# Patient Record
Sex: FEMALE | Race: OTHER | HISPANIC OR LATINO | URBAN - METROPOLITAN AREA
[De-identification: names, ages, dates, MRNs, and addresses within clinical notes are randomized per-mention and may not be internally consistent; named-entity substitution may affect disease eponyms.]

---

## 2017-08-20 ENCOUNTER — EMERGENCY (EMERGENCY)
Facility: HOSPITAL | Age: 62
LOS: 1 days | Discharge: HOME | End: 2017-08-20
Attending: EMERGENCY MEDICINE | Admitting: EMERGENCY MEDICINE
Payer: COMMERCIAL

## 2017-08-20 VITALS
RESPIRATION RATE: 18 BRPM | TEMPERATURE: 99 F | DIASTOLIC BLOOD PRESSURE: 73 MMHG | OXYGEN SATURATION: 99 % | HEART RATE: 80 BPM | SYSTOLIC BLOOD PRESSURE: 120 MMHG | HEIGHT: 64 IN | WEIGHT: 293 LBS

## 2017-08-20 DIAGNOSIS — M25.561 PAIN IN RIGHT KNEE: ICD-10-CM

## 2017-08-20 DIAGNOSIS — I10 ESSENTIAL (PRIMARY) HYPERTENSION: ICD-10-CM

## 2017-08-20 DIAGNOSIS — Z79.82 LONG TERM (CURRENT) USE OF ASPIRIN: ICD-10-CM

## 2017-08-20 DIAGNOSIS — Z90.49 ACQUIRED ABSENCE OF OTHER SPECIFIED PARTS OF DIGESTIVE TRACT: Chronic | ICD-10-CM

## 2017-08-20 DIAGNOSIS — Z79.899 OTHER LONG TERM (CURRENT) DRUG THERAPY: ICD-10-CM

## 2017-08-20 DIAGNOSIS — Z98.890 OTHER SPECIFIED POSTPROCEDURAL STATES: Chronic | ICD-10-CM

## 2017-08-20 PROCEDURE — 99053 MED SERV 10PM-8AM 24 HR FAC: CPT

## 2017-08-20 PROCEDURE — 99283 EMERGENCY DEPT VISIT LOW MDM: CPT | Mod: 25

## 2017-08-20 PROCEDURE — 96372 THER/PROPH/DIAG INJ SC/IM: CPT

## 2017-08-20 PROCEDURE — 99284 EMERGENCY DEPT VISIT MOD MDM: CPT | Mod: 25

## 2017-08-20 RX ORDER — KETOROLAC TROMETHAMINE 30 MG/ML
30 SYRINGE (ML) INJECTION ONCE
Qty: 0 | Refills: 0 | Status: DISCONTINUED | OUTPATIENT
Start: 2017-08-20 | End: 2017-08-20

## 2017-08-20 RX ADMIN — Medication 30 MILLIGRAM(S): at 01:55

## 2017-08-20 NOTE — ED PROVIDER NOTE - OBJECTIVE STATEMENT
Pt ambulates to ED with right knee pain. Pt states she returned from [place] last night and was experiencing knee pain so she came to the ED. Pt rates pain 10/10 on VAS and has been feeling this pain for 3 months. Pt states she is currently not taking any medication for the pain. Pt states her orthopaedic doctor is Dr. Snyder and she usually follows up with him. Pt denies calf pain, shortness of breath.

## 2017-08-20 NOTE — ED PROVIDER NOTE - ATTENDING CONTRIBUTION TO CARE
61F with a known h/o bilateral knee arthritis who p/w months of anterior right knee pain. She was in [place] and treated with unknown meds there, she has seen Dr. Snyder in the past and been given a joint injection which helped.. She is scared to pursue TKA. She has not taken anything lately for pain. Pt's old xrays reviewed. Given lack of recent trauma or fall, no indication for new xrays at this time. Hx and exam not c/w DVT. Pt with no significant swelling, LE NVI distally, no evidence of cellulitis or septic joint. Pt noted to be morbidly obese, which is likely contributing to her sx. Pt treated with IM toradol and advised to f/u with Dr. Snyder for mgmt of arthritis. Stable for CO.

## 2017-08-20 NOTE — ED ADULT TRIAGE NOTE - ARRIVAL INFO ADDITIONAL COMMENTS
c.o right knee pain for 3 months, worsening today. denies any injuries. c.o sharp pains when she walks on extremities or when she sits down.

## 2017-08-20 NOTE — ED PROVIDER NOTE - MEDICAL DECISION MAKING DETAILS
Pt given Toradol for pain. Pt is obese. Advised pt to try to lose weight and focus on lifestyle changes to help decrease pressure on her knee. Right knee X-ray from 1/29/16 reviewed and showed degenerative changes of the right knee, with joint space narrowing of the medial tibiofemoral compartment and osteophytosis. Instructed pt to make an appt to f/u with Dr. Snyder. Pt stable for discharge.

## 2017-08-20 NOTE — ED PROVIDER NOTE - PHYSICAL EXAMINATION
Vasc: pedal pulses palpable. popliteal pulse palpable.   Derm: No erythema. No edema. No open lesions.  MSK: (-) anterior drawer test. (-) pain on valgus/varus stress on right knee. Pain on palpation of the anteriomedial right knee. muscle strength 5/5 in all compartments.  Neuro: grossly intact

## 2017-08-30 PROBLEM — Z00.00 ENCOUNTER FOR PREVENTIVE HEALTH EXAMINATION: Noted: 2017-08-30

## 2017-08-31 ENCOUNTER — OUTPATIENT (OUTPATIENT)
Dept: OUTPATIENT SERVICES | Facility: HOSPITAL | Age: 62
LOS: 1 days | End: 2017-08-31

## 2017-08-31 ENCOUNTER — APPOINTMENT (OUTPATIENT)
Dept: ORTHOPEDIC SURGERY | Facility: CLINIC | Age: 62
End: 2017-08-31

## 2017-08-31 ENCOUNTER — OTHER (OUTPATIENT)
Age: 62
End: 2017-08-31

## 2017-08-31 ENCOUNTER — APPOINTMENT (OUTPATIENT)
Dept: RADIOLOGY | Facility: CLINIC | Age: 62
End: 2017-08-31
Payer: COMMERCIAL

## 2017-08-31 ENCOUNTER — APPOINTMENT (OUTPATIENT)
Dept: ORTHOPEDIC SURGERY | Facility: CLINIC | Age: 62
End: 2017-08-31
Payer: COMMERCIAL

## 2017-08-31 VITALS — RESPIRATION RATE: 16 BRPM | BODY MASS INDEX: 49.51 KG/M2 | WEIGHT: 290 LBS | HEIGHT: 64 IN

## 2017-08-31 VITALS — DIASTOLIC BLOOD PRESSURE: 86 MMHG | HEART RATE: 75 BPM | SYSTOLIC BLOOD PRESSURE: 137 MMHG

## 2017-08-31 DIAGNOSIS — Z98.890 OTHER SPECIFIED POSTPROCEDURAL STATES: Chronic | ICD-10-CM

## 2017-08-31 DIAGNOSIS — Z80.51 FAMILY HISTORY OF MALIGNANT NEOPLASM OF KIDNEY: ICD-10-CM

## 2017-08-31 DIAGNOSIS — M19.90 UNSPECIFIED OSTEOARTHRITIS, UNSPECIFIED SITE: ICD-10-CM

## 2017-08-31 DIAGNOSIS — Z80.0 FAMILY HISTORY OF MALIGNANT NEOPLASM OF DIGESTIVE ORGANS: ICD-10-CM

## 2017-08-31 DIAGNOSIS — Z90.49 ACQUIRED ABSENCE OF OTHER SPECIFIED PARTS OF DIGESTIVE TRACT: Chronic | ICD-10-CM

## 2017-08-31 DIAGNOSIS — Z87.891 PERSONAL HISTORY OF NICOTINE DEPENDENCE: ICD-10-CM

## 2017-08-31 DIAGNOSIS — R73.9 HYPERGLYCEMIA, UNSPECIFIED: ICD-10-CM

## 2017-08-31 PROCEDURE — 73564 X-RAY EXAM KNEE 4 OR MORE: CPT | Mod: 26,50

## 2017-08-31 PROCEDURE — 99203 OFFICE O/P NEW LOW 30 MIN: CPT | Mod: 25

## 2017-08-31 PROCEDURE — 20610 DRAIN/INJ JOINT/BURSA W/O US: CPT | Mod: RT

## 2017-09-08 ENCOUNTER — APPOINTMENT (OUTPATIENT)
Dept: ORTHOPEDIC SURGERY | Facility: CLINIC | Age: 62
End: 2017-09-08
Payer: COMMERCIAL

## 2017-09-08 PROCEDURE — 99214 OFFICE O/P EST MOD 30 MIN: CPT

## 2017-09-13 ENCOUNTER — APPOINTMENT (OUTPATIENT)
Dept: ORTHOPEDIC SURGERY | Facility: CLINIC | Age: 62
End: 2017-09-13
Payer: COMMERCIAL

## 2017-09-13 PROCEDURE — 99214 OFFICE O/P EST MOD 30 MIN: CPT

## 2017-10-11 ENCOUNTER — APPOINTMENT (OUTPATIENT)
Dept: ORTHOPEDIC SURGERY | Facility: CLINIC | Age: 62
End: 2017-10-11

## 2017-10-25 ENCOUNTER — APPOINTMENT (OUTPATIENT)
Dept: ORTHOPEDIC SURGERY | Facility: CLINIC | Age: 62
End: 2017-10-25
Payer: COMMERCIAL

## 2017-10-25 DIAGNOSIS — M17.12 UNILATERAL PRIMARY OSTEOARTHRITIS, LEFT KNEE: ICD-10-CM

## 2017-10-25 PROCEDURE — 99213 OFFICE O/P EST LOW 20 MIN: CPT | Mod: 25

## 2017-10-25 PROCEDURE — 20610 DRAIN/INJ JOINT/BURSA W/O US: CPT | Mod: RT

## 2017-10-25 RX ORDER — DICLOFENAC SODIUM 10 MG/G
1 GEL TOPICAL
Qty: 100 | Refills: 1 | Status: DISCONTINUED | COMMUNITY
Start: 2017-08-31 | End: 2017-10-25

## 2017-10-25 RX ORDER — METFORMIN HYDROCHLORIDE 625 MG/1
TABLET ORAL
Refills: 0 | Status: DISCONTINUED | COMMUNITY
End: 2017-10-25

## 2017-10-25 RX ORDER — DICLOFENAC SODIUM 10 MG/G
1 GEL TOPICAL
Qty: 100 | Refills: 0 | Status: DISCONTINUED | COMMUNITY
Start: 2017-08-31 | End: 2017-10-25

## 2017-10-25 RX ORDER — ENALAPRIL MALEATE 5 MG/1
TABLET ORAL
Refills: 0 | Status: DISCONTINUED | COMMUNITY
End: 2017-10-25

## 2017-10-25 RX ORDER — ASPIRIN/CALCIUM/MAG/ALUMINUM 325 MG
325 TABLET, DELAYED RELEASE (ENTERIC COATED) ORAL
Refills: 0 | Status: DISCONTINUED | COMMUNITY
End: 2017-10-25

## 2017-11-01 ENCOUNTER — APPOINTMENT (OUTPATIENT)
Dept: ORTHOPEDIC SURGERY | Facility: CLINIC | Age: 62
End: 2017-11-01
Payer: COMMERCIAL

## 2017-11-01 PROCEDURE — 20610 DRAIN/INJ JOINT/BURSA W/O US: CPT | Mod: RT

## 2017-11-08 ENCOUNTER — APPOINTMENT (OUTPATIENT)
Dept: ORTHOPEDIC SURGERY | Facility: CLINIC | Age: 62
End: 2017-11-08
Payer: COMMERCIAL

## 2017-11-08 DIAGNOSIS — M17.11 UNILATERAL PRIMARY OSTEOARTHRITIS, RIGHT KNEE: ICD-10-CM

## 2017-11-08 PROCEDURE — 20610 DRAIN/INJ JOINT/BURSA W/O US: CPT | Mod: RT

## 2017-11-08 RX ORDER — HYALURONATE SODIUM 10 MG/ML
20 VIAL (ML) INTRAARTICULAR
Qty: 1 | Refills: 0 | Status: DISCONTINUED | OUTPATIENT
Start: 2017-09-13 | End: 2017-11-08

## 2017-12-08 ENCOUNTER — APPOINTMENT (OUTPATIENT)
Dept: ORTHOPEDIC SURGERY | Facility: CLINIC | Age: 62
End: 2017-12-08

## 2021-08-09 NOTE — ED ADULT NURSE NOTE - CHPI ED SYMPTOMS NEG
Refill request: Levemir 100    Last OV 4/15/21  Last Date filled 5/25/21  Next OV8/17/21  A!C level 7.2 on 3/5/21    Medication approved for refill.    no numbness/no deformity/no tingling/no weakness/no fever/no abrasion/no back pain/no bruising

## 2022-02-05 PROBLEM — I10 ESSENTIAL (PRIMARY) HYPERTENSION: Chronic | Status: ACTIVE | Noted: 2017-08-20

## 2022-04-12 ENCOUNTER — NON-APPOINTMENT (OUTPATIENT)
Age: 67
End: 2022-04-12

## 2022-04-12 ENCOUNTER — APPOINTMENT (OUTPATIENT)
Dept: OBGYN | Facility: CLINIC | Age: 67
End: 2022-04-12
Payer: MEDICAID

## 2022-04-12 VITALS — DIASTOLIC BLOOD PRESSURE: 90 MMHG | WEIGHT: 293 LBS | BODY MASS INDEX: 55.61 KG/M2 | SYSTOLIC BLOOD PRESSURE: 150 MMHG

## 2022-04-12 DIAGNOSIS — N95.0 POSTMENOPAUSAL BLEEDING: ICD-10-CM

## 2022-04-12 PROCEDURE — 99203 OFFICE O/P NEW LOW 30 MIN: CPT | Mod: 25

## 2022-04-12 PROCEDURE — 58100 BIOPSY OF UTERUS LINING: CPT

## 2022-04-12 NOTE — REASON FOR VISIT
[Initial] : an initial consultation for [Postmenopausal Bleeding] : postmenopausal bleeding [Spouse] : spouse

## 2022-04-13 NOTE — PLAN
[FreeTextEntry1] : Ms. Rivera presents for evaluation of postmenopausal bleeding.\par - Vitals reviewed - BP elevated but patient has known hypertension and she is compliant with her medications. \par - Breast exam, pelvic exam and pap smear performed today. \par - EMB performed given postmenopausal bleeding. \par - Patient has a referral for screening mammogram; she is up to date with colonoscopy. \par - Referral for pelvic sonogram given. \par  \par Return to the office pending results, as needed for GYN concerns and in 1 year for annual follow up. Plan of care discussed with patient who has no additional questions and is in agreement.\par

## 2022-04-13 NOTE — HISTORY OF PRESENT ILLNESS
[postmenopausal] : postmenopausal [Post-Menopause, No Sxs] : post-menopausal, currently without symptoms [FreeTextEntry1] : Ms. LASHELL CAVAZOS  is a 67 yo postmenopausal P3 who presents today evaluation of postmenopausal bleeding. The patient reports intermittent bleeding since before the onset of the COVID19 pandemic. The bleeding is sporadic and not every month. She occasionally notes clots and the bleeding ranges from bright red to dark brown. Denies any associated pain or blood in her bowel movements. She reports that she feels otherwise well. \par \par ObHx:  x3\par GynHx: Denies abnormal pap smears, STIs, ovarian cysts, fibroids; not currently sexually active; postmenopausal for >15 years; unsure of the date of the last pap smear \par MedHx: Stroke in 2022, hypertension, pre-diabetes, obesity, arthritis\par SurgHx: Right knee and ankle surgery \par NKDA ; allergy to adhesive \par Medications: metformin, enalapril, ASA\par \par Patient denies vaginal discomfort/itching, vaginal discharge, dysuria, changes to her bowel habits, incontinence or any other GYN symptoms. [Mammogramdate] : 2021 [PapSmeardate] : 2021 [PGHxTotal] : 3 [[facility] : [facility]

## 2022-04-13 NOTE — PHYSICAL EXAM
[Chaperone Present] : A chaperone was present in the examining room during all aspects of the physical examination [Appropriately responsive] : appropriately responsive [Alert] : alert [Soft] : soft [Non-tender] : non-tender [No Lesions] : no lesions [No Mass] : no mass [Oriented x3] : oriented x3 [FreeTextEntry7] : Exam limited by patient's habitus  [Breast Atrophy Bilateral] : atrophy [No Masses] : no breast masses were palpable [Labia Majora] : normal [Labia Minora] : normal [Normal] : normal [FreeTextEntry5] : bleeding visualized from the os  [FreeTextEntry6] : Not able to assess secondary to habitus

## 2022-04-13 NOTE — PROCEDURE
[Cervical Pap Smear] : cervical Pap smear [Liquid Base] : liquid base [Endometrial Biopsy] : Endometrial biopsy [Consent Obtained] : Consent obtained [Post-Menop. Bleeding] : post-menopausal bleeding [Risks] : risks [Benefits] : benefits [Alternatives] : alternatives [Patient] : patient [Infection] : infection [Bleeding] : bleeding [Allergic Reaction] : allergic reaction [Uterine Perforation] : uterine perforation [Pain] : pain [Betadine] : Betadine [None] : none [Tenaculum] : Tenaculum [Easy Passage] : Easy passage [Retroverted] : retroverted [Moderate] : moderate [Sent to Pathology] : placed in buffered formalin and sent for pathology [ECC] : Endocervical curettage was also performed [Tolerated Well] : Patient tolerated the procedure well [No Complications] : No complications

## 2022-04-19 LAB — CYTOLOGY CVX/VAG DOC THIN PREP: NORMAL

## 2022-05-03 ENCOUNTER — APPOINTMENT (OUTPATIENT)
Dept: GYNECOLOGIC ONCOLOGY | Facility: CLINIC | Age: 67
End: 2022-05-03
Payer: MEDICAID

## 2022-05-06 ENCOUNTER — APPOINTMENT (OUTPATIENT)
Dept: GYNECOLOGIC ONCOLOGY | Facility: CLINIC | Age: 67
End: 2022-05-06
Payer: MEDICAID

## 2022-05-06 ENCOUNTER — NON-APPOINTMENT (OUTPATIENT)
Age: 67
End: 2022-05-06

## 2022-05-06 VITALS
WEIGHT: 293 LBS | OXYGEN SATURATION: 96 % | HEIGHT: 64 IN | HEART RATE: 78 BPM | SYSTOLIC BLOOD PRESSURE: 156 MMHG | TEMPERATURE: 97.3 F | DIASTOLIC BLOOD PRESSURE: 94 MMHG | BODY MASS INDEX: 50.02 KG/M2

## 2022-05-06 PROCEDURE — 99205 OFFICE O/P NEW HI 60 MIN: CPT

## 2022-05-06 NOTE — CHIEF COMPLAINT
[FreeTextEntry1] : 67 yo postmenopausal P3 who presents today with CAH results in EMB (22) for PMB.\par The patient reports intermittent bleeding since before the onset of the COVID19 pandemic. The bleeding is sporadic and not every month. She occasionally notes clots and the bleeding ranges from bright red to dark brown. Denies any associated pain or blood in her bowel movements. She reports that she feels otherwise well. \par \par Patient denies vaginal discomfort/itching, vaginal discharge, dysuria, changes to her bowel habits, incontinence or any other GYN symptoms. \par \par ObHx:  x3\par GynHx: Denies abnormal pap smears, STIs, ovarian cysts, fibroids; not currently sexually active; premature menopause at the age ~37 (no work-up was done) post-menopausal, currently without symptoms. \par MedHx: Stroke in 2022 (right hand weakness), went to [facility] by then could not move her legs with nausea. CT head normal scan, takes ASA since then., hypertension, pre-diabetes, obesity, arthritis\par SurgHx: Right knee and ankle surgery, L/S cholecystectomy (a few years back)\par \par Social: Former smoker , No alcohol use, No illicit drug use\par NKDA ; allergy to adhesive \par Medications: metformin QD, enalapril QD, ASA 81mg\par Family history of liver cancer - Mother, and malignant neoplasm of kidney - brother.\par \par \par Health Maintenance\par PAP Smear: 2022 - WNL\par Last mammogram:  WNL\par Last colonoscopy:  WNL\par Exposed to COVID , Vaccinated x2

## 2022-05-06 NOTE — ASSESSMENT
[FreeTextEntry1] : With the aid of diagrams, we reviewed the findings and discussed the pathology results. \par Endometrial hyperplasia is caused by an imbalance between estrogen and progesterone, often described as “unopposed estrogen.” The risk of complex hyperplasia with atypia progressing to endometrial cancer is above 30%. Recent data has shown that there is a 40% chance that endometrial cancer will be discovered in the uterus at the time of surgery for complex atypical hyperplasia.\par \par Recommendation is for surgical removal of the uterus with removal of the bilateral tubes and ovaries. Different surgical approaches discussed including minimally invasive and open approaches. I recommend advanced laparoscopic robotic assisted total hysterectomy, bilateral salpingo-oophorectomy. I explained that a SLN biopsy is normally done, but in her case I do not think it will be technically possible.  \par \par Complications that include, but are not limited to: bleeding, infection, injury to other organs including bowel, bladder, ureters, blood vessels, nerves; infections, blood clots, lymphedema, pneumonia, wound complications and prolonged hospital stay have all been discussed with the patient. Whenever minimally invasive surgery is attempted, there is a chance of needing to convert to laparotomy. The risk of occult injury requiring additional surgery also discussed. I have also provided her with the diagrams. I emphasized that this patient is at markedly higher risk of complications 2' to her morbid obesity and that I may have to abort if I do not feel I can complete the surgery safely. In this case she will be offered progesterone therapy and referral for weight loss. \par \par Surgical scheduling was discussed and instructions for optimization prior to surgery were given. She will have a clear liquid diet one day prior, and will follow the Enhanced Recovery after Surgery (ERAS) protocol.  No aspirin or NSAID products for 1 week prior. She will choose a surgical date.\par \par [] Neuro clearance (recent stroke?)\par [] Medical clearance\par [] COVID test pre-op\par [] CT abdomen pelvis\par [] Robot assisted TLH/BSO with Dr. Lew

## 2022-05-06 NOTE — HISTORY OF PRESENT ILLNESS
[FreeTextEntry1] : Problem List\par 1. CAH\par \par Previous Therapy\par 1. PAP 4/19/22- Neg \par 1. S/P EMB 4/25/22- CAH\par 2. S/P ECC 4/25/22- Endocervical mucosa with squamous metaplasia

## 2022-05-09 ENCOUNTER — NON-APPOINTMENT (OUTPATIENT)
Age: 67
End: 2022-05-09

## 2022-05-12 ENCOUNTER — APPOINTMENT (OUTPATIENT)
Dept: NEUROLOGY | Facility: CLINIC | Age: 67
End: 2022-05-12
Payer: MEDICAID

## 2022-05-12 VITALS
HEIGHT: 64 IN | BODY MASS INDEX: 50.02 KG/M2 | SYSTOLIC BLOOD PRESSURE: 142 MMHG | OXYGEN SATURATION: 95 % | TEMPERATURE: 97.9 F | DIASTOLIC BLOOD PRESSURE: 84 MMHG | WEIGHT: 293 LBS | HEART RATE: 69 BPM

## 2022-05-12 PROCEDURE — 99214 OFFICE O/P EST MOD 30 MIN: CPT

## 2022-05-12 PROCEDURE — 99204 OFFICE O/P NEW MOD 45 MIN: CPT

## 2022-05-13 RX ORDER — ENALAPRIL MALEATE 10 MG/1
10 TABLET ORAL DAILY
Refills: 0 | Status: ACTIVE | COMMUNITY

## 2022-05-13 RX ORDER — METFORMIN HYDROCHLORIDE 500 MG/1
500 TABLET, COATED ORAL DAILY
Refills: 0 | Status: ACTIVE | COMMUNITY

## 2022-05-13 NOTE — HISTORY OF PRESENT ILLNESS
[FreeTextEntry1] : Pt is a 66yoF with PMH HTN, pre-DM here for surgical clearance for biopsy of uterine mass. SHe was seen in [facility] In March for recurrent episodes of R arm feeling heavy with associated slurred speech. Had 4 episodes in total, including one while in the ER. She also reports nausea and whole body weakness "I couldn't get out of the cab" at the time of these episodes. Unfortunately pt does not have any records with her today for my review. SHe is unclear what the results of her CT/ MRI/ vessel imaging were, but says she was told she had "small stroke." She says she had EEG which was reportedly negative. She is not sure if echo was done (either TTE/ MANFRED). She has not had any cardiac rhythm monitoring after her d/c. She says she was d/c home on ASA 81mg daily, 30 day course of plavix which she has already completed, as well as  atorvastatin 10mg daily. She has not had any recurrent stroke-like episodes since. She denies any residual deficits from her TIA/stroke. 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 \par \par

## 2022-05-13 NOTE — PHYSICAL EXAM
[FreeTextEntry1] : The patient is alert and oriented x3, naming intact x3, repetition normal, follows three-step commands, and is able to participate fully in the history taking.\par Speech is normal with no evidence of dysarthria.\par Memory is intact: Immediate recall 3 out of 3, short-term 3 out of 3, remote memory intact\par Cranial nerves II through XII intact\par Motor exam: Upper and lower extremities 5 out of 5 power, normal tone. No abnormal movements noted.\par Sensory exam: Intact to light touch and pinprick. Romberg negative.\par Coordination and vestibular exam: Finger to nose intact, no evidence of truncal or appendicular ataxia. No evidence of nystagmus. no vestibular symptoms elicited with head turning during ambulation.\par Gait: steady\par Reflexes: One to 2+ in upper and lower extremities. No pathological reflexes. Downgoing toes.\par

## 2022-05-13 NOTE — ASSESSMENT
[FreeTextEntry1] : Pt is a 66yoF with PMH HTN, pre-DM, here for neurologic pre-op clearance for total hysterectomy in the setting of recent TIA/ stroke. Unfortunately this pt is new to me and given that she has no records with her I can not provide her with clearance today. I do not know the results of her workup thus far nor do I know the suspected mechanism behind her stroke/ TIA.  I have advised her to obtain records from [facility] by this week or early next week for my review. I do anticipate being able to give her clearance which I would like to do in an expedited fashion given the nature of the procedure. My sense is that she had TIA (as she is pretty certain that her MRI was negative for acute stroke) and if that were the case aspirin monotherapy would be appropriate. If that is the case, clearance is straightfrward which I would provide with routine stroke precautions. On another note, I explained that we may still need to do further workup into etiology of her TIA including TTE and cardiac rhythm monitoring (I prefer ILR even if TIA). WIll wait to see what has been already done at [facility] and then discuss with her my recommendations. I have provided her with my phone number/ email so that she contact me easily when she gets these records.

## 2022-07-15 ENCOUNTER — APPOINTMENT (OUTPATIENT)
Dept: NEUROLOGY | Facility: CLINIC | Age: 67
End: 2022-07-15

## 2022-07-15 VITALS
DIASTOLIC BLOOD PRESSURE: 77 MMHG | HEART RATE: 85 BPM | BODY MASS INDEX: 50.02 KG/M2 | WEIGHT: 293 LBS | TEMPERATURE: 98.1 F | OXYGEN SATURATION: 96 % | HEIGHT: 64 IN | SYSTOLIC BLOOD PRESSURE: 120 MMHG

## 2022-07-15 DIAGNOSIS — Z86.73 PERSONAL HISTORY OF TRANSIENT ISCHEMIC ATTACK (TIA), AND CEREBRAL INFARCTION W/OUT RESIDUAL DEFICITS: ICD-10-CM

## 2022-07-15 LAB — PLATELET RESPONSE ASPIRIN: 648 ARU

## 2022-07-15 PROCEDURE — 99214 OFFICE O/P EST MOD 30 MIN: CPT

## 2022-07-15 RX ORDER — CLOPIDOGREL BISULFATE 75 MG/1
75 TABLET, FILM COATED ORAL DAILY
Refills: 0 | Status: DISCONTINUED | COMMUNITY
End: 2022-07-15

## 2022-07-15 RX ORDER — ASPIRIN 81 MG/1
81 TABLET, CHEWABLE ORAL
Qty: 30 | Refills: 0 | Status: ACTIVE | COMMUNITY
Start: 2022-05-17

## 2022-07-15 RX ORDER — FOLIC ACID 1 MG/1
1 TABLET ORAL
Qty: 30 | Refills: 0 | Status: ACTIVE | COMMUNITY
Start: 2022-07-07

## 2022-07-15 RX ORDER — GABAPENTIN 300 MG/1
300 CAPSULE ORAL
Qty: 60 | Refills: 0 | Status: ACTIVE | COMMUNITY
Start: 2022-05-17

## 2022-07-15 NOTE — ASSESSMENT
[FreeTextEntry1] : Pt is a 66yoF with PMH HTN, pre-DM, here for neurologic pre-op clearance for total hysterectomy in the setting of recent TIA/ stroke in March 2022. Would prefer to have results from her hospital stay but given concern for underlying malignancy the priority is for her to proceed with treatment of her uterine mass. I am more comfortable doing so as she has reportedly only had TIA and has been doing well since March- 4+ months- with no recurrent events. She should continue with Aspirin 81mg daily and Atovastatin for secondary stroke prevention. She should monitor for any bleeding concerns or myalgias which are typical side effects of these medications. Pt is cleared for surgery with general TIA/ stroke precautions- letter provided below. Will order routine MRI and vessel imaging via US for baseline, as well as other routine stroke labs since I am unable to view any of these results from [place] and would prefer to have these in our system. Will also order routine stroke labs to help optimize her medically and consider and further medication adjustments. Will refer to EP for holter/ ILR as that has not yet been done. She does not need to wait for all these tests to be complete prior to proceeding with surgery. \par Counselled on healthy eating (DASH/ Mediterranean diet, limiting red meats) and importance of weight reduction for a neurovascular/ cardiovascular standpoint. Counselled on importance of remaining active. She should also continue to f/u with PCP regarding regular health maintenance and prevention, including routine screening. Counselled on signs of stroke BEFAST and to call 911 with any new or worsening neurological symptoms \par  \america William [phone]- requests results be given to her at this number

## 2022-07-15 NOTE — HISTORY OF PRESENT ILLNESS
[FreeTextEntry1] : Pt is a 66yoF with PMH HTN, pre-DM, morbid obesity, here for f/u and surgical clearance for biopsy of uterine mass.She is accompanied today by her daughter Nataliia who is helping to provide history. Briefly, she was seen in [facility] In March for recurrent episodes of R arm feeling heavy with associated slurred speech. Had 4 episodes in total, including one while in the ER. She also reports nausea and whole body weakness "I couldn't get out of the cab" at the time of these episodes. Unfortunately has still not been able to obtain any records from her hospital stay although she is confident that her workup was unremarkable (including MRI, TTE) and she was told the episode was a TIA. She has completed 3 month course of DAPT and is now on ASA 81mg daily alone, as directed to her at time of discharge from [place]. She has not had any recurrent stroke-like episodes since. She denies any residual deficits from her TIA/stroke. 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 BP is well-controlled, 120/77 in office today. \par \par

## 2022-07-15 NOTE — CONSULT LETTER
[Courtesy Letter:] : I had the pleasure of seeing your patient, [unfilled], in my office today. [FreeTextEntry1] : This letter is to certify that Georgia Rivera is a patient at [facility] with a past history of TIA/ stroke. While the patient is neurologically cleared for hysterectomy, there are general safety considerations that apply to patients with past stroke who are on antiplatelet medications. \par From a neurovascular standpoint, optimally it would be beneficial for her to continue antiplatelet therapy throughout the kathryn -procedural period. If her antiplatelet therapy needs to be discontinued prior to the surgical procedure, then she would be at an increased risk for stroke during that time interval.  If antiplatelet therapy needs to be stopped, it should be withheld for the shortest duration of time possible and should be restarted as soon as possible as deemed safe by the surgical team. Increased risk of thromboembolic complications such as stroke during the period that antiplatelet therapy is withheld was discussed with patient in detail. \par It is also advised to maintain normotension and avoid any episodes of hypotension in the kathryn-procedural period.\par If you have any additional questions please feel free to contact me at [phone].  [FreeTextEntry3] : Shirlene Stauffer AGACNP-C\par Nurse Practitioner\par [facility] \par Department of Neurology\par [address]\par [place] [id number]\par Tel: [phone]\par Fax: [phone]\par Email: [email or web address] \par

## 2022-07-18 LAB
ALBUMIN SERPL ELPH-MCNC: 4.2 G/DL
ALP BLD-CCNC: 97 U/L
ALT SERPL-CCNC: 12 U/L
ANION GAP SERPL CALC-SCNC: 12 MMOL/L
APO LP(A) SERPL-MCNC: 11.3 NMOL/L
AST SERPL-CCNC: 13 U/L
BASOPHILS # BLD AUTO: 0.04 K/UL
BASOPHILS NFR BLD AUTO: 0.4 %
BILIRUB SERPL-MCNC: 0.3 MG/DL
BUN SERPL-MCNC: 20 MG/DL
CALCIUM SERPL-MCNC: 9.4 MG/DL
CHLORIDE SERPL-SCNC: 102 MMOL/L
CO2 SERPL-SCNC: 25 MMOL/L
CREAT SERPL-MCNC: 0.72 MG/DL
EGFR: 93 ML/MIN/1.73M2
EOSINOPHIL # BLD AUTO: 0.11 K/UL
EOSINOPHIL NFR BLD AUTO: 1 %
ESTIMATED AVERAGE GLUCOSE: 120 MG/DL
GLUCOSE SERPL-MCNC: 124 MG/DL
HBA1C MFR BLD HPLC: 5.8 %
HCT VFR BLD CALC: 42 %
HGB BLD-MCNC: 12.6 G/DL
IMM GRANULOCYTES NFR BLD AUTO: 0.5 %
LYMPHOCYTES # BLD AUTO: 1.91 K/UL
LYMPHOCYTES NFR BLD AUTO: 17.3 %
MAN DIFF?: NORMAL
MCHC RBC-ENTMCNC: 25 PG
MCHC RBC-ENTMCNC: 30 GM/DL
MCV RBC AUTO: 83.5 FL
MONOCYTES # BLD AUTO: 0.73 K/UL
MONOCYTES NFR BLD AUTO: 6.6 %
NEUTROPHILS # BLD AUTO: 8.18 K/UL
NEUTROPHILS NFR BLD AUTO: 74.2 %
PLATELET # BLD AUTO: 359 K/UL
POTASSIUM SERPL-SCNC: 3.8 MMOL/L
PROT SERPL-MCNC: 7.4 G/DL
RBC # BLD: 5.03 M/UL
RBC # FLD: 14.6 %
SODIUM SERPL-SCNC: 139 MMOL/L
TSH SERPL-ACNC: 2.23 UIU/ML
WBC # FLD AUTO: 11.03 K/UL

## 2022-07-18 RX ORDER — ASPIRIN 81 MG/1
81 TABLET ORAL
Qty: 30 | Refills: 1 | Status: ACTIVE | COMMUNITY
Start: 2022-07-18 | End: 1900-01-01

## 2022-07-19 LAB
CHOLEST SERPL-MCNC: 152 MG/DL
HDLC SERPL-MCNC: 52 MG/DL
LDLC SERPL CALC-MCNC: 84 MG/DL
NONHDLC SERPL-MCNC: 100 MG/DL
TRIGL SERPL-MCNC: 79 MG/DL

## 2022-07-19 RX ORDER — ATORVASTATIN CALCIUM 40 MG/1
40 TABLET, FILM COATED ORAL
Qty: 1 | Refills: 1 | Status: ACTIVE | COMMUNITY
Start: 1900-01-01 | End: 1900-01-01

## 2022-07-25 ENCOUNTER — RESULT REVIEW (OUTPATIENT)
Age: 67
End: 2022-07-25

## 2022-07-25 ENCOUNTER — OUTPATIENT (OUTPATIENT)
Dept: OUTPATIENT SERVICES | Facility: HOSPITAL | Age: 67
LOS: 1 days | End: 2022-07-25
Payer: MEDICAID

## 2022-07-25 ENCOUNTER — APPOINTMENT (OUTPATIENT)
Dept: MRI IMAGING | Facility: HOSPITAL | Age: 67
End: 2022-07-25

## 2022-07-25 DIAGNOSIS — Z90.49 ACQUIRED ABSENCE OF OTHER SPECIFIED PARTS OF DIGESTIVE TRACT: Chronic | ICD-10-CM

## 2022-07-25 DIAGNOSIS — Z98.890 OTHER SPECIFIED POSTPROCEDURAL STATES: Chronic | ICD-10-CM

## 2022-07-25 PROCEDURE — 70551 MRI BRAIN STEM W/O DYE: CPT

## 2022-07-25 PROCEDURE — 70551 MRI BRAIN STEM W/O DYE: CPT | Mod: 26

## 2022-08-03 ENCOUNTER — APPOINTMENT (OUTPATIENT)
Dept: NEUROLOGY | Facility: CLINIC | Age: 67
End: 2022-08-03

## 2022-08-05 ENCOUNTER — APPOINTMENT (OUTPATIENT)
Dept: NEUROLOGY | Facility: CLINIC | Age: 67
End: 2022-08-05

## 2022-08-05 PROCEDURE — 93886 INTRACRANIAL COMPLETE STUDY: CPT

## 2022-08-05 PROCEDURE — 93880 EXTRACRANIAL BILAT STUDY: CPT

## 2022-11-07 ENCOUNTER — NON-APPOINTMENT (OUTPATIENT)
Age: 67
End: 2022-11-07

## 2022-11-10 ENCOUNTER — APPOINTMENT (OUTPATIENT)
Dept: GYNECOLOGIC ONCOLOGY | Facility: HOSPITAL | Age: 67
End: 2022-11-10

## 2022-11-15 ENCOUNTER — APPOINTMENT (OUTPATIENT)
Dept: GYNECOLOGIC ONCOLOGY | Facility: CLINIC | Age: 67
End: 2022-11-15
Payer: MEDICARE

## 2022-11-15 ENCOUNTER — NON-APPOINTMENT (OUTPATIENT)
Age: 67
End: 2022-11-15

## 2022-11-15 VITALS
WEIGHT: 293 LBS | HEART RATE: 85 BPM | OXYGEN SATURATION: 98 % | TEMPERATURE: 97.3 F | BODY MASS INDEX: 50.02 KG/M2 | SYSTOLIC BLOOD PRESSURE: 144 MMHG | HEIGHT: 64 IN | DIASTOLIC BLOOD PRESSURE: 84 MMHG

## 2022-11-15 DIAGNOSIS — N85.02 ENDOMETRIAL INTRAEPITHELIAL NEOPLASIA [EIN]: ICD-10-CM

## 2022-11-15 PROCEDURE — 99215 OFFICE O/P EST HI 40 MIN: CPT

## 2022-11-15 NOTE — REASON FOR VISIT
[FreeTextEntry1] : Follow up exam prior to surgery . CT A/P still needed\par \par Got both neurology and PCP clearance yesterday, will have results faxed over. States she was supposed to have surgery last week but was cancelled because she still needed clearance. Reports dry cough last week that has now resolved. Denies fevers, chills, or body aches. Patient denies vaginal discomfort/itching, vaginal discharge, dysuria, changes to her bowel habits, incontinence or any other GYN symptoms. \par \par ObHx:  x3\par GynHx: Denies abnormal pap smears, STIs, ovarian cysts, fibroids; not currently sexually active; premature menopause at the age ~37 (no work-up was done) post-menopausal, currently without symptoms. \par MedHx: Stroke in 2022 (right hand weakness), went to [facility] by then could not move her legs with nausea. CT head normal scan, takes ASA since then., hypertension, pre-diabetes, obesity, arthritis\par SurgHx: Right knee and ankle surgery, L/S cholecystectomy (a few years back)\par \par Social: Former smoker , No alcohol use, No illicit drug use\par NKDA ; allergy to adhesive \par Medications: metformin QD, enalapril QD, ASA 81mg\par Family history of liver cancer - Mother, and malignant neoplasm of kidney - brother.

## 2022-11-15 NOTE — PHYSICAL EXAM
[Normal] : Anus and perineum: Normal sphincter tone, no masses, no prolapse. [de-identified] : central obesity, nontender, umbilical scar from prior surgery [de-identified] : Could not appreciate pelvic organs adequately. Patient's obesity does not allow for a good exam.

## 2022-11-15 NOTE — ASSESSMENT
[FreeTextEntry1] : With the aid of diagrams, we reviewed the findings and discussed the pathology results. \par Endometrial hyperplasia is caused by an imbalance between estrogen and progesterone, often described as “unopposed estrogen.” The risk of complex hyperplasia with atypia progressing to endometrial cancer is above 30%. Recent data has shown that there is a 40% chance that endometrial cancer will be discovered in the uterus at the time of surgery for complex atypical hyperplasia.\par \par Recommendation is for surgical removal of the uterus with removal of the bilateral tubes and ovaries. Different surgical approaches discussed including minimally invasive and open approaches. I recommend advanced laparoscopic robotic assisted total hysterectomy, bilateral salpingo-oophorectomy. I explained that a SLN biopsy is normally done, but in her case I do not think it will be technically possible. \par \par Complications that include, but are not limited to: bleeding, infection, injury to other organs including bowel, bladder, ureters, blood vessels, nerves; infections, blood clots, lymphedema, pneumonia, wound complications and prolonged hospital stay have all been discussed with the patient. Whenever minimally invasive surgery is attempted, there is a chance of needing to convert to laparotomy. The risk of occult injury requiring additional surgery also discussed. I have also provided her with the diagrams. I emphasized that this patient is at markedly higher risk of complications 2' to her morbid obesity and that I may have to abort if I do not feel I can complete the surgery safely. In this case she will be offered progesterone therapy and referral for weight loss. In any case, I strongly recommend that she consider weight loss methods. \par \par Surgical scheduling was discussed and instructions for optimization prior to surgery were given. She will have a clear liquid diet one day prior, and will follow the Enhanced Recovery after Surgery (ERAS) protocol. No aspirin or NSAID products for 1 week prior. She will choose a surgical date.\par \par [] Neuro clearance- July note seems to clear her\par [] Medical clearance\par [] COVID test pre-op\par [] CT abdomen pelvis, CXR\par [] TLH/BSO\par

## 2022-11-15 NOTE — CHIEF COMPLAINT
[FreeTextEntry1] : 67 yo postmenopausal P3 who presents today with CAH results in EMB (22) for PMB.\par The patient reports intermittent bleeding since before the onset of the COVID19 pandemic. The bleeding is sporadic and not every month. She occasionally notes clots and the bleeding ranges from bright red to dark brown. Denies any associated pain or blood in her bowel movements. She reports that she feels otherwise well. \par \par Patient denies vaginal discomfort/itching, vaginal discharge, dysuria, changes to her bowel habits, incontinence or any other GYN symptoms. \par \par ObHx:  x3\par GynHx: Denies abnormal pap smears, STIs, ovarian cysts, fibroids; not currently sexually active; premature menopause at the age ~37 (no work-up was done) post-menopausal, currently without symptoms. \par MedHx: Stroke in 2022 (right hand weakness), went to [facility] by then could not move her legs with nausea. CT head normal scan, takes ASA since then., hypertension, pre-diabetes, obesity, arthritis\par SurgHx: Right knee and ankle surgery, L/S cholecystectomy (a few years back)\par \par Social: Former smoker , No alcohol use, No illicit drug use\par NKDA ; allergy to adhesive \par Medications: metformin QD, enalapril QD, ASA 81mg\par Family history of liver cancer - Mother, and malignant neoplasm of kidney - brother.\par \par \par Health Maintenance\par PAP Smear: 2022 - WNL\par Last mammogram:  WNL\par Last colonoscopy:  WNL\par Exposed to COVID , Vaccinated x2

## 2022-11-22 ENCOUNTER — NON-APPOINTMENT (OUTPATIENT)
Age: 67
End: 2022-11-22

## 2022-11-23 ENCOUNTER — RESULT REVIEW (OUTPATIENT)
Age: 67
End: 2022-11-23

## 2022-11-26 ENCOUNTER — OUTPATIENT (OUTPATIENT)
Dept: OUTPATIENT SERVICES | Facility: HOSPITAL | Age: 67
LOS: 1 days | End: 2022-11-26
Payer: MEDICARE

## 2022-11-26 ENCOUNTER — APPOINTMENT (OUTPATIENT)
Dept: CT IMAGING | Facility: HOSPITAL | Age: 67
End: 2022-11-26

## 2022-11-26 DIAGNOSIS — Z98.890 OTHER SPECIFIED POSTPROCEDURAL STATES: Chronic | ICD-10-CM

## 2022-11-26 DIAGNOSIS — Z90.49 ACQUIRED ABSENCE OF OTHER SPECIFIED PARTS OF DIGESTIVE TRACT: Chronic | ICD-10-CM

## 2022-11-26 PROCEDURE — 82565 ASSAY OF CREATININE: CPT

## 2022-11-26 PROCEDURE — 74177 CT ABD & PELVIS W/CONTRAST: CPT | Mod: 26

## 2022-11-26 PROCEDURE — 74177 CT ABD & PELVIS W/CONTRAST: CPT

## 2022-11-28 ENCOUNTER — TRANSCRIPTION ENCOUNTER (OUTPATIENT)
Age: 67
End: 2022-11-28

## 2022-11-28 ENCOUNTER — OUTPATIENT (OUTPATIENT)
Dept: OUTPATIENT SERVICES | Facility: HOSPITAL | Age: 67
LOS: 1 days | End: 2022-11-28
Payer: MEDICARE

## 2022-11-28 VITALS
SYSTOLIC BLOOD PRESSURE: 161 MMHG | TEMPERATURE: 97 F | HEIGHT: 64 IN | OXYGEN SATURATION: 97 % | RESPIRATION RATE: 18 BRPM | HEART RATE: 75 BPM | WEIGHT: 293 LBS | DIASTOLIC BLOOD PRESSURE: 90 MMHG

## 2022-11-28 DIAGNOSIS — Z90.49 ACQUIRED ABSENCE OF OTHER SPECIFIED PARTS OF DIGESTIVE TRACT: Chronic | ICD-10-CM

## 2022-11-28 DIAGNOSIS — Z98.890 OTHER SPECIFIED POSTPROCEDURAL STATES: Chronic | ICD-10-CM

## 2022-11-28 PROCEDURE — 71046 X-RAY EXAM CHEST 2 VIEWS: CPT | Mod: 26

## 2022-11-28 PROCEDURE — 71046 X-RAY EXAM CHEST 2 VIEWS: CPT

## 2022-11-28 NOTE — ASU PATIENT PROFILE, ADULT - FALL HARM RISK - HARM RISK INTERVENTIONS
Communicate Risk of Fall with Harm to all staff/Reinforce activity limits and safety measures with patient and family/Tailored Fall Risk Interventions/Visual Cue: Yellow wristband and red socks/Bed in lowest position, wheels locked, appropriate side rails in place/Call bell, personal items and telephone in reach/Instruct patient to call for assistance before getting out of bed or chair/Non-slip footwear when patient is out of bed/[place] to call system/Physically safe environment - no spills, clutter or unnecessary equipment/Purposeful Proactive Rounding/Room/bathroom lighting operational, light cord in reach Assistance with ambulation/Assistance OOB with selected safe patient handling equipment/Communicate Risk of Fall with Harm to all staff/Discuss with provider need for PT consult/Monitor gait and stability/Provide patient with walking aids - walker, cane, crutches/Reinforce activity limits and safety measures with patient and family/Tailored Fall Risk Interventions/Visual Cue: Yellow wristband and red socks/Bed in lowest position, wheels locked, appropriate side rails in place/Call bell, personal items and telephone in reach/Instruct patient to call for assistance before getting out of bed or chair/Non-slip footwear when patient is out of bed/[place] to call system/Physically safe environment - no spills, clutter or unnecessary equipment/Purposeful Proactive Rounding/Room/bathroom lighting operational, light cord in reach

## 2022-11-28 NOTE — ASU PATIENT PROFILE, ADULT - NSICDXPASTSURGICALHX_GEN_ALL_CORE_FT
PAST SURGICAL HISTORY:  H/O left knee surgery     History of cholecystectomy      PAST SURGICAL HISTORY:  H/O left knee surgery     History of ankle surgery right    History of cholecystectomy      PAST SURGICAL HISTORY:  H/O left knee surgery Arthroscopy    History of ankle surgery right    History of cholecystectomy

## 2022-11-28 NOTE — ASU PATIENT PROFILE, ADULT - NSICDXPASTMEDICALHX_GEN_ALL_CORE_FT
PAST MEDICAL HISTORY:  HTN (hypertension)      PAST MEDICAL HISTORY:  DM (diabetes mellitus) boarderline    Gallbladder problem     HTN (hypertension)     Stroke mild     PAST MEDICAL HISTORY:  DM (diabetes mellitus) boarderline    Gallbladder problem     HTN (hypertension)     Stroke mild - [place] 2022

## 2022-11-29 ENCOUNTER — TRANSCRIPTION ENCOUNTER (OUTPATIENT)
Age: 67
End: 2022-11-29

## 2022-11-29 ENCOUNTER — RESULT REVIEW (OUTPATIENT)
Age: 67
End: 2022-11-29

## 2022-11-29 ENCOUNTER — APPOINTMENT (OUTPATIENT)
Dept: GYNECOLOGIC ONCOLOGY | Facility: HOSPITAL | Age: 67
End: 2022-11-29

## 2022-11-29 ENCOUNTER — INPATIENT (INPATIENT)
Facility: HOSPITAL | Age: 67
LOS: 1 days | Discharge: HOME | DRG: 740 | End: 2022-12-01
Attending: OBSTETRICS & GYNECOLOGY | Admitting: OBSTETRICS & GYNECOLOGY
Payer: MEDICARE

## 2022-11-29 DIAGNOSIS — Z98.890 OTHER SPECIFIED POSTPROCEDURAL STATES: Chronic | ICD-10-CM

## 2022-11-29 DIAGNOSIS — Z90.49 ACQUIRED ABSENCE OF OTHER SPECIFIED PARTS OF DIGESTIVE TRACT: Chronic | ICD-10-CM

## 2022-11-29 LAB
BASE EXCESS BLDA CALC-SCNC: -0.2 MMOL/L — SIGNIFICANT CHANGE UP (ref -2–3)
BLD GP AB SCN SERPL QL: NEGATIVE — SIGNIFICANT CHANGE UP
CA-I BLDA-SCNC: 1.2 MMOL/L — SIGNIFICANT CHANGE UP (ref 1.15–1.33)
CO2 BLDA-SCNC: 26 MMOL/L — HIGH (ref 19–24)
COHGB MFR BLDA: 1.9 % — SIGNIFICANT CHANGE UP
GLUCOSE BLDA-MCNC: 108 MG/DL — HIGH (ref 70–99)
GLUCOSE BLDC GLUCOMTR-MCNC: 117 MG/DL — HIGH (ref 70–99)
GLUCOSE BLDC GLUCOMTR-MCNC: 144 MG/DL — HIGH (ref 70–99)
HCO3 BLDA-SCNC: 25 MMOL/L — SIGNIFICANT CHANGE UP (ref 21–28)
HGB BLDA-MCNC: 10.8 G/DL — LOW (ref 11.7–16.1)
METHGB MFR BLDA: 0.9 % — SIGNIFICANT CHANGE UP
OXYHGB MFR BLDA: 97.1 % — HIGH (ref 90–95)
PCO2 BLDA: 41 MMHG — SIGNIFICANT CHANGE UP (ref 32–45)
PH BLDA: 7.39 — SIGNIFICANT CHANGE UP (ref 7.35–7.45)
PO2 BLDA: 278 MMHG — HIGH (ref 83–108)
POTASSIUM BLDA-SCNC: 3.8 MMOL/L — SIGNIFICANT CHANGE UP (ref 3.5–5.1)
RH IG SCN BLD-IMP: POSITIVE — SIGNIFICANT CHANGE UP
SAO2 % BLDA: 100 % — HIGH (ref 94–98)
SODIUM BLDA-SCNC: 130 MMOL/L — LOW (ref 136–145)

## 2022-11-29 PROCEDURE — 88342 IMHCHEM/IMCYTCHM 1ST ANTB: CPT | Mod: 26

## 2022-11-29 PROCEDURE — 88309 TISSUE EXAM BY PATHOLOGIST: CPT | Mod: 26

## 2022-11-29 PROCEDURE — 58571 TLH W/T/O 250 G OR LESS: CPT | Mod: 22

## 2022-11-29 PROCEDURE — 88341 IMHCHEM/IMCYTCHM EA ADD ANTB: CPT | Mod: 26

## 2022-11-29 RX ORDER — SODIUM CHLORIDE 9 MG/ML
1000 INJECTION, SOLUTION INTRAVENOUS
Refills: 0 | Status: DISCONTINUED | OUTPATIENT
Start: 2022-11-29 | End: 2022-12-01

## 2022-11-29 RX ORDER — KETOROLAC TROMETHAMINE 30 MG/ML
30 SYRINGE (ML) INJECTION EVERY 8 HOURS
Refills: 0 | Status: DISCONTINUED | OUTPATIENT
Start: 2022-11-29 | End: 2022-11-30

## 2022-11-29 RX ORDER — ATORVASTATIN CALCIUM 80 MG/1
1 TABLET, FILM COATED ORAL
Qty: 0 | Refills: 0 | DISCHARGE

## 2022-11-29 RX ORDER — DEXTROSE 50 % IN WATER 50 %
12.5 SYRINGE (ML) INTRAVENOUS ONCE
Refills: 0 | Status: DISCONTINUED | OUTPATIENT
Start: 2022-11-29 | End: 2022-12-01

## 2022-11-29 RX ORDER — OXYCODONE HYDROCHLORIDE 5 MG/1
5 TABLET ORAL
Refills: 0 | Status: DISCONTINUED | OUTPATIENT
Start: 2022-11-29 | End: 2022-12-01

## 2022-11-29 RX ORDER — DEXTROSE 50 % IN WATER 50 %
15 SYRINGE (ML) INTRAVENOUS ONCE
Refills: 0 | Status: DISCONTINUED | OUTPATIENT
Start: 2022-11-29 | End: 2022-12-01

## 2022-11-29 RX ORDER — ASPIRIN/CALCIUM CARB/MAGNESIUM 324 MG
1 TABLET ORAL
Qty: 0 | Refills: 0 | DISCHARGE

## 2022-11-29 RX ORDER — OXYCODONE HYDROCHLORIDE 5 MG/1
10 TABLET ORAL EVERY 4 HOURS
Refills: 0 | Status: DISCONTINUED | OUTPATIENT
Start: 2022-11-29 | End: 2022-12-01

## 2022-11-29 RX ORDER — ONDANSETRON 8 MG/1
8 TABLET, FILM COATED ORAL EVERY 8 HOURS
Refills: 0 | Status: DISCONTINUED | OUTPATIENT
Start: 2022-11-29 | End: 2022-12-01

## 2022-11-29 RX ORDER — SIMETHICONE 80 MG/1
80 TABLET, CHEWABLE ORAL EVERY 6 HOURS
Refills: 0 | Status: DISCONTINUED | OUTPATIENT
Start: 2022-11-29 | End: 2022-12-01

## 2022-11-29 RX ORDER — HYDROMORPHONE HYDROCHLORIDE 2 MG/ML
0.5 INJECTION INTRAMUSCULAR; INTRAVENOUS; SUBCUTANEOUS
Refills: 0 | Status: DISCONTINUED | OUTPATIENT
Start: 2022-11-29 | End: 2022-12-01

## 2022-11-29 RX ORDER — DEXTROSE 50 % IN WATER 50 %
25 SYRINGE (ML) INTRAVENOUS ONCE
Refills: 0 | Status: DISCONTINUED | OUTPATIENT
Start: 2022-11-29 | End: 2022-12-01

## 2022-11-29 RX ORDER — HEPARIN SODIUM 5000 [USP'U]/ML
5000 INJECTION INTRAVENOUS; SUBCUTANEOUS ONCE
Refills: 0 | Status: COMPLETED | OUTPATIENT
Start: 2022-11-29 | End: 2022-11-29

## 2022-11-29 RX ORDER — PANTOPRAZOLE SODIUM 20 MG/1
40 TABLET, DELAYED RELEASE ORAL EVERY 24 HOURS
Refills: 0 | Status: DISCONTINUED | OUTPATIENT
Start: 2022-11-29 | End: 2022-12-01

## 2022-11-29 RX ORDER — ACETAMINOPHEN 500 MG
1000 TABLET ORAL EVERY 6 HOURS
Refills: 0 | Status: DISCONTINUED | OUTPATIENT
Start: 2022-11-29 | End: 2022-12-01

## 2022-11-29 RX ORDER — METFORMIN HYDROCHLORIDE 850 MG/1
0 TABLET ORAL
Qty: 0 | Refills: 0 | DISCHARGE

## 2022-11-29 RX ORDER — FOLIC ACID 0.8 MG
1 TABLET ORAL
Qty: 0 | Refills: 0 | DISCHARGE

## 2022-11-29 RX ORDER — SODIUM CHLORIDE 9 MG/ML
1000 INJECTION, SOLUTION INTRAVENOUS
Refills: 0 | Status: DISCONTINUED | OUTPATIENT
Start: 2022-11-29 | End: 2022-11-29

## 2022-11-29 RX ORDER — CELECOXIB 200 MG/1
400 CAPSULE ORAL ONCE
Refills: 0 | Status: COMPLETED | OUTPATIENT
Start: 2022-11-29 | End: 2022-11-29

## 2022-11-29 RX ORDER — GLUCAGON INJECTION, SOLUTION 0.5 MG/.1ML
1 INJECTION, SOLUTION SUBCUTANEOUS ONCE
Refills: 0 | Status: DISCONTINUED | OUTPATIENT
Start: 2022-11-29 | End: 2022-12-01

## 2022-11-29 RX ORDER — ENOXAPARIN SODIUM 100 MG/ML
40 INJECTION SUBCUTANEOUS EVERY 12 HOURS
Refills: 0 | Status: DISCONTINUED | OUTPATIENT
Start: 2022-11-30 | End: 2022-12-01

## 2022-11-29 RX ORDER — ACETAMINOPHEN 500 MG
1000 TABLET ORAL ONCE
Refills: 0 | Status: COMPLETED | OUTPATIENT
Start: 2022-11-29 | End: 2022-11-29

## 2022-11-29 RX ORDER — INSULIN LISPRO 100/ML
VIAL (ML) SUBCUTANEOUS
Refills: 0 | Status: DISCONTINUED | OUTPATIENT
Start: 2022-11-29 | End: 2022-12-01

## 2022-11-29 RX ORDER — IBUPROFEN 200 MG
600 TABLET ORAL EVERY 6 HOURS
Refills: 0 | Status: COMPLETED | OUTPATIENT
Start: 2022-11-29 | End: 2023-10-28

## 2022-11-29 RX ORDER — METFORMIN HYDROCHLORIDE 850 MG/1
1 TABLET ORAL
Qty: 0 | Refills: 0 | DISCHARGE

## 2022-11-29 RX ORDER — SODIUM CHLORIDE 9 MG/ML
1000 INJECTION, SOLUTION INTRAVENOUS
Refills: 0 | Status: DISCONTINUED | OUTPATIENT
Start: 2022-11-29 | End: 2022-11-30

## 2022-11-29 RX ADMIN — CELECOXIB 400 MILLIGRAM(S): 200 CAPSULE ORAL at 12:32

## 2022-11-29 RX ADMIN — Medication 1000 MILLIGRAM(S): at 12:31

## 2022-11-29 RX ADMIN — Medication 1000 MILLIGRAM(S): at 19:40

## 2022-11-29 RX ADMIN — Medication 30 MILLIGRAM(S): at 23:30

## 2022-11-29 RX ADMIN — Medication 30 MILLIGRAM(S): at 22:45

## 2022-11-29 RX ADMIN — HEPARIN SODIUM 5000 UNIT(S): 5000 INJECTION INTRAVENOUS; SUBCUTANEOUS at 12:32

## 2022-11-29 RX ADMIN — Medication 0: at 17:40

## 2022-11-29 NOTE — DISCHARGE NOTE PROVIDER - HOSPITAL COURSE
68yo P3 w/ CAH s/p TLH, BSO and cystoscopy. She was admitted for post-operative monitoring due to multiple medical co-morbidities. She was discharged on POD1 after meeting all post-operative milestones, clinically and hemodynamically stable. 68yo P3 w/ CAH s/p TLH, BSO and cystoscopy. She was admitted for post-operative monitoring due to multiple medical co-morbidities. She was evaluated by PT and discharged on POD2 after meeting all post-operative milestones, clinically and hemodynamically stable. 68yo P3 w/ CAH s/p TLH, BSO and cystoscopy. She was admitted for post-operative monitoring due to multiple medical co-morbidities. She was evaluated by PT and cleared to be discharged home with no needs.  Pt discharged on POD2 after meeting all post-operative milestones, clinically and hemodynamically stable.

## 2022-11-29 NOTE — H&P ADULT - HISTORY OF PRESENT ILLNESS
65yo postmenopausal woman w/CAH presents for scheduled TLH, BSO.     65 yo postmenopausal P3 who presents today with CAH results in EMB (22) for PMB.  The patient reports intermittent bleeding since before the onset of the COVID19 pandemic. The bleeding is sporadic and not every month. She occasionally notes clots and the bleeding ranges from bright red to dark brown. Denies any associated pain or blood in her bowel movements. She reports that she feels otherwise well.     Patient denies vaginal discomfort/itching, vaginal discharge, dysuria, changes to her bowel habits, incontinence or any other GYN symptoms.     ObHx:  x3  GynHx: Denies abnormal pap smears, STIs, ovarian cysts, fibroids; not currently sexually active; premature menopause at the age ~37 (no work-up was done) post-menopausal, currently without symptoms.   MedHx: Stroke in 2022 (right hand weakness), went to [facility] by then could not move her legs with nausea. CT head normal scan, takes ASA since then., hypertension, pre-diabetes, obesity, arthritis  SurgHx: Right knee and ankle surgery, L/S cholecystectomy (a few years back)    Social: Former smoker , No alcohol use, No illicit drug use  NKDA ; allergy to adhesive   Medications: metformin QD, enalapril QD, ASA 81mg  Family history of liver cancer - Mother, and malignant neoplasm of kidney - brother  Vital Signs Last 24 Hrs  T(C): 36.2 (2022 14:23), Max: 36.2 (2022 14:23)  T(F): --  HR: 75 (2022 14:23) (75 - 75)  BP: 161/90 (2022 14:23) (161/90 - 161/90)  BP(mean): --  RR: 18 (2022 14:23) (18 - 18)  SpO2: 97% (2022 14:23) (97% - 97%)        Physical Exam:  Gen: NAD, comfortable  GI: morbidly obese, soft, nontender      LABS:  hg 13.7  Cr 0.69              RADIOLOGY & ADDITIONAL STUDIES:

## 2022-11-29 NOTE — DISCHARGE NOTE PROVIDER - CARE PROVIDER_API CALL
Polina Peters)  Obstetrics and Gynecology  [address] [place] [address], [address], [address]  [place], [place] [facility]  Phone: [phone]  Fax: [phone]  Follow Up Time:

## 2022-11-29 NOTE — DISCHARGE NOTE PROVIDER - NSDCFUSCHEDAPPT_GEN_ALL_CORE_FT
[facility] Physician [facility]  GYNONC [address]  Scheduled Appointment: 12/09/2022    Polina Peters  [facility] Physician [facility]  GYNONC [address]  Scheduled Appointment: 12/27/2022    Shirlene Stauffer  [facility] Physician [facility]  NEUROLOGY [address]  Scheduled Appointment: 01/13/2023

## 2022-11-29 NOTE — BRIEF OPERATIVE NOTE - OPERATION/FINDINGS
Morbidly obese, 10weeks size uterus on bimanual exam. Medium vcare manipulator placed. Direct entrance under visualization using 5mm bariatric trocar in LUQ. Bowel adhesion to upper abdomen c/w prior surgeries. 5mm bariatric trocar placed suprapubically, umbilical and RLQ. TLH, BSO performed with ligasure and harmonic. Specimens delivered through colpotomy. Vaginal cuff closed vaginally with vlock suture. Hemostasis excellent. All port sites closed with monocryl and dermabond.

## 2022-11-29 NOTE — DISCHARGE NOTE PROVIDER - NSDCMRMEDTOKEN_GEN_ALL_CORE_FT
aspirin 81 mg oral tablet: 1 tab(s) orally once a day  atorvastatin 10 mg oral tablet: 1 tab(s) orally once a day  enalapril 10 mg oral tablet: 1 tab(s) orally once a day  folic acid 1 mg oral tablet: 1 tab(s) orally once a day  metFORMIN 500 mg oral tablet: 1 tab(s) orally once a day

## 2022-11-29 NOTE — CHART NOTE - NSCHARTNOTEFT_GEN_A_CORE
Pt seen and examined at bedside. Pt complains of mild abdominal pain.   Pt denies any fever, chills, chest pain, SOB, nausea or vomiting. Has tolerated sips of clears. Has not voided yet.    T(F): 97.9 (11-29-22 @ 19:56), Max: 97.9 (11-29-22 @ 19:56)  HR: 67 (11-29-22 @ 19:05) (67 - 83)  BP: 126/61 (11-29-22 @ 19:05) (125/58 - 161/90)  RR: 14 (11-29-22 @ 19:05) (12 - 18)  SpO2: 96% (11-29-22 @ 19:05) (94% - 100%)  Wt(kg): --    11-29 @ 07:01  -  11-29 @ 20:57  --------------------------------------------------------  IN: 300 mL / OUT: 0 mL / NET: 300 mL        acetaminophen     Tablet .. 1000 milliGRAM(s) Oral every 6 hours  acetaminophen     Tablet .. 1000 milliGRAM(s) Oral every 6 hours PRN Mild Pain (1 - 3)  dextrose 5%. 1000 milliLiter(s) IV Continuous <Continuous>  dextrose 5%. 1000 milliLiter(s) IV Continuous <Continuous>  dextrose 50% Injectable 25 Gram(s) IV Push once  dextrose 50% Injectable 12.5 Gram(s) IV Push once  dextrose 50% Injectable 25 Gram(s) IV Push once  dextrose Oral Gel 15 Gram(s) Oral once PRN Blood Glucose LESS THAN 70 milliGRAM(s)/deciliter  glucagon  Injectable 1 milliGRAM(s) IntraMuscular once  HYDROmorphone  Injectable 0.5 milliGRAM(s) IV Push every 15 minutes PRN Severe Pain (7 - 10)  ibuprofen  Tablet. 600 milliGRAM(s) Oral every 6 hours PRN Mild Pain (1 - 3)  insulin lispro (ADMELOG) corrective regimen sliding scale   SubCutaneous three times a day before meals  ketorolac   Injectable 30 milliGRAM(s) IV Push every 8 hours  lactated ringers. 1000 milliLiter(s) IV Continuous <Continuous>  ondansetron Injectable 8 milliGRAM(s) IV Push every 8 hours PRN Nausea and/or Vomiting  oxyCODONE    IR 5 milliGRAM(s) Oral every 3 hours PRN Moderate Pain (4 - 6)  oxyCODONE    IR 10 milliGRAM(s) Oral every 4 hours PRN Severe Pain (7 - 10)  pantoprazole  Injectable 40 milliGRAM(s) IV Push every 24 hours  simethicone 80 milliGRAM(s) Chew every 6 hours PRN Gas      Physical exam:  Constitutional: NAD  Abdomen: incision site clean, dry and intact. Soft, mildly tender, nondistended  Extremities: no lower extremity edema, or calf tenderness. SCDs in place    A:   67y, s/p TLH, BSO and cystoscopy. Uncomplicated.   Post operative check performed.    Plan:  Neuro: Tylenol/Toradol, Oxy PRN  Resp: RA  CV: LV diastolic dysfunction, VSS, LR @ 150  GI: LRD, ISS. zofran PRN, protonix, simethicone, -/-  : s/p ashley  VTE: SCDs, lovenox 40 BID (in AM)

## 2022-11-29 NOTE — DISCHARGE NOTE PROVIDER - NSDCCPCAREPLAN_GEN_ALL_CORE_FT
PRINCIPAL DISCHARGE DIAGNOSIS  Diagnosis: Complex atypical endometrial hyperplasia  Assessment and Plan of Treatment:

## 2022-11-29 NOTE — BRIEF OPERATIVE NOTE - NSICDXBRIEFPROCEDURE_GEN_ALL_CORE_FT
PROCEDURES:  Hysterectomy, total, for uterus 250 grams or less, laparoscopic, with salpingo-oophorectomy 29-Nov-2022 16:40:55  Graciela Lamb

## 2022-11-29 NOTE — H&P ADULT - ASSESSMENT
9yo postmenopausal P3 w/hx of stroke, prediabetes, morbid obesity, htn, hld and CAH now POD0 s/p TLH, BSO.   - Admit to GYN for postoperative monitoring                                 1. Neuro/Pain:  Acetaminophen 1000mg IV q8 ATC, Toradol 30mg IV q8 ATC, Oxycodone 5mg or 10mg PRN, Hydromorphone 0.2mg IVP for breakthrough  2  CV:   VS per routine  3. Pulm: Encourage ISS  4. GI: Low Residue Diet, Protonix 20mg daily, Simethicone PRN, IVF   5. :  s/p gabi, f/u TOV at 00:00  6. Heme: AM CBC  7. ID: --  8. DVT ppx: SCDs, Lovenox 40mg Qd  9. Dispo: Likely POD#1

## 2022-11-29 NOTE — DISCHARGE NOTE PROVIDER - NSDCCPTREATMENT_GEN_ALL_CORE_FT
PRINCIPAL PROCEDURE  Procedure: Hysterectomy, total, for uterus 250 grams or less, laparoscopic, with salpingo-oophorectomy  Findings and Treatment:

## 2022-11-30 LAB
A1C WITH ESTIMATED AVERAGE GLUCOSE RESULT: 5.4 % — SIGNIFICANT CHANGE UP (ref 4–5.6)
ALBUMIN SERPL ELPH-MCNC: 3 G/DL — LOW (ref 3.3–5)
ALP SERPL-CCNC: 77 U/L — SIGNIFICANT CHANGE UP (ref 40–120)
ALT FLD-CCNC: 13 U/L — SIGNIFICANT CHANGE UP (ref 10–45)
ANION GAP SERPL CALC-SCNC: 9 MMOL/L — SIGNIFICANT CHANGE UP (ref 5–17)
AST SERPL-CCNC: 19 U/L — SIGNIFICANT CHANGE UP (ref 10–40)
BILIRUB SERPL-MCNC: 0.6 MG/DL — SIGNIFICANT CHANGE UP (ref 0.2–1.2)
BUN SERPL-MCNC: 6 MG/DL — LOW (ref 7–23)
CALCIUM SERPL-MCNC: 8.5 MG/DL — SIGNIFICANT CHANGE UP (ref 8.4–10.5)
CHLORIDE SERPL-SCNC: 104 MMOL/L — SIGNIFICANT CHANGE UP (ref 96–108)
CO2 SERPL-SCNC: 26 MMOL/L — SIGNIFICANT CHANGE UP (ref 22–31)
CREAT SERPL-MCNC: 0.63 MG/DL — SIGNIFICANT CHANGE UP (ref 0.5–1.3)
EGFR: 98 ML/MIN/1.73M2 — SIGNIFICANT CHANGE UP
ESTIMATED AVERAGE GLUCOSE: 108 MG/DL — SIGNIFICANT CHANGE UP (ref 68–114)
GLUCOSE BLDC GLUCOMTR-MCNC: 115 MG/DL — HIGH (ref 70–99)
GLUCOSE BLDC GLUCOMTR-MCNC: 120 MG/DL — HIGH (ref 70–99)
GLUCOSE BLDC GLUCOMTR-MCNC: 121 MG/DL — HIGH (ref 70–99)
GLUCOSE BLDC GLUCOMTR-MCNC: 99 MG/DL — SIGNIFICANT CHANGE UP (ref 70–99)
GLUCOSE SERPL-MCNC: 114 MG/DL — HIGH (ref 70–99)
HCT VFR BLD CALC: 36.4 % — SIGNIFICANT CHANGE UP (ref 34.5–45)
HCV AB S/CO SERPL IA: 0.03 S/CO — SIGNIFICANT CHANGE UP
HCV AB SERPL-IMP: SIGNIFICANT CHANGE UP
HGB BLD-MCNC: 11.1 G/DL — LOW (ref 11.5–15.5)
MAGNESIUM SERPL-MCNC: 1.8 MG/DL — SIGNIFICANT CHANGE UP (ref 1.6–2.6)
MCHC RBC-ENTMCNC: 25.3 PG — LOW (ref 27–34)
MCHC RBC-ENTMCNC: 30.5 GM/DL — LOW (ref 32–36)
MCV RBC AUTO: 83.1 FL — SIGNIFICANT CHANGE UP (ref 80–100)
NRBC # BLD: 0 /100 WBCS — SIGNIFICANT CHANGE UP (ref 0–0)
PHOSPHATE SERPL-MCNC: 3 MG/DL — SIGNIFICANT CHANGE UP (ref 2.5–4.5)
PLATELET # BLD AUTO: 281 K/UL — SIGNIFICANT CHANGE UP (ref 150–400)
POTASSIUM SERPL-MCNC: 3.8 MMOL/L — SIGNIFICANT CHANGE UP (ref 3.5–5.3)
POTASSIUM SERPL-SCNC: 3.8 MMOL/L — SIGNIFICANT CHANGE UP (ref 3.5–5.3)
PROT SERPL-MCNC: 6.5 G/DL — SIGNIFICANT CHANGE UP (ref 6–8.3)
RBC # BLD: 4.38 M/UL — SIGNIFICANT CHANGE UP (ref 3.8–5.2)
RBC # FLD: 14.6 % — HIGH (ref 10.3–14.5)
SODIUM SERPL-SCNC: 139 MMOL/L — SIGNIFICANT CHANGE UP (ref 135–145)
WBC # BLD: 10.94 K/UL — HIGH (ref 3.8–10.5)
WBC # FLD AUTO: 10.94 K/UL — HIGH (ref 3.8–10.5)

## 2022-11-30 RX ORDER — METOPROLOL TARTRATE 50 MG
5 TABLET ORAL ONCE
Refills: 0 | Status: COMPLETED | OUTPATIENT
Start: 2022-11-30 | End: 2022-11-30

## 2022-11-30 RX ORDER — IBUPROFEN 200 MG
600 TABLET ORAL EVERY 6 HOURS
Refills: 0 | Status: DISCONTINUED | OUTPATIENT
Start: 2022-11-30 | End: 2022-11-30

## 2022-11-30 RX ORDER — IBUPROFEN 200 MG
600 TABLET ORAL EVERY 6 HOURS
Refills: 0 | Status: DISCONTINUED | OUTPATIENT
Start: 2022-11-30 | End: 2022-12-01

## 2022-11-30 RX ORDER — POTASSIUM CHLORIDE 20 MEQ
20 PACKET (EA) ORAL ONCE
Refills: 0 | Status: COMPLETED | OUTPATIENT
Start: 2022-11-30 | End: 2022-11-30

## 2022-11-30 RX ORDER — MAGNESIUM SULFATE 500 MG/ML
1 VIAL (ML) INJECTION ONCE
Refills: 0 | Status: COMPLETED | OUTPATIENT
Start: 2022-11-30 | End: 2022-11-30

## 2022-11-30 RX ADMIN — Medication 30 MILLIGRAM(S): at 15:02

## 2022-11-30 RX ADMIN — OXYCODONE HYDROCHLORIDE 10 MILLIGRAM(S): 5 TABLET ORAL at 22:42

## 2022-11-30 RX ADMIN — Medication 1000 MILLIGRAM(S): at 14:49

## 2022-11-30 RX ADMIN — Medication 5 MILLIGRAM(S): at 01:00

## 2022-11-30 RX ADMIN — Medication 1000 MILLIGRAM(S): at 19:50

## 2022-11-30 RX ADMIN — ENOXAPARIN SODIUM 40 MILLIGRAM(S): 100 INJECTION SUBCUTANEOUS at 18:15

## 2022-11-30 RX ADMIN — Medication 30 MILLIGRAM(S): at 16:08

## 2022-11-30 RX ADMIN — Medication 5 MILLIGRAM(S): at 23:16

## 2022-11-30 RX ADMIN — Medication 5 MILLIGRAM(S): at 21:40

## 2022-11-30 RX ADMIN — PANTOPRAZOLE SODIUM 40 MILLIGRAM(S): 20 TABLET, DELAYED RELEASE ORAL at 11:59

## 2022-11-30 RX ADMIN — Medication 100 GRAM(S): at 11:58

## 2022-11-30 RX ADMIN — ONDANSETRON 8 MILLIGRAM(S): 8 TABLET, FILM COATED ORAL at 21:27

## 2022-11-30 RX ADMIN — Medication 1000 MILLIGRAM(S): at 12:00

## 2022-11-30 RX ADMIN — Medication 1000 MILLIGRAM(S): at 18:15

## 2022-11-30 RX ADMIN — Medication 10 MILLIGRAM(S): at 09:54

## 2022-11-30 RX ADMIN — Medication 1000 MILLIGRAM(S): at 23:54

## 2022-11-30 RX ADMIN — Medication 30 MILLIGRAM(S): at 06:40

## 2022-11-30 RX ADMIN — Medication 1000 MILLIGRAM(S): at 01:00

## 2022-11-30 RX ADMIN — Medication 1000 MILLIGRAM(S): at 06:39

## 2022-11-30 RX ADMIN — OXYCODONE HYDROCHLORIDE 10 MILLIGRAM(S): 5 TABLET ORAL at 20:55

## 2022-11-30 RX ADMIN — Medication 20 MILLIEQUIVALENT(S): at 11:59

## 2022-11-30 RX ADMIN — ONDANSETRON 8 MILLIGRAM(S): 8 TABLET, FILM COATED ORAL at 00:22

## 2022-11-30 RX ADMIN — Medication 1000 MILLIGRAM(S): at 00:22

## 2022-11-30 RX ADMIN — ENOXAPARIN SODIUM 40 MILLIGRAM(S): 100 INJECTION SUBCUTANEOUS at 06:39

## 2022-11-30 NOTE — PROVIDER CONTACT NOTE (OTHER) - SITUATION
bp high 188/88 , then 173/68
Pt given metoprolol IV to treat HTN and 10mg oxy PO to treat headache, metoprolol brought BP down to 170s, oxy brought headache to 6/10
Pt c/o 10/10 headache and BP in 190s after two checks

## 2022-11-30 NOTE — PROGRESS NOTE ADULT - SUBJECTIVE AND OBJECTIVE BOX
GYN PROGRESS NOTE    Patient evaluated at the bedside. One episode of emesis last night after eating too many grapes. Since then denies n/v. Tolerating liquids.  Denies CP/SOB/dizziness/nausea/vomiting/abdominal pain/calf pain.  Pain well controlled on oral pain medications. Has not yet ambulated or passed flatus. Voiding. She is worried about her 5 flight walk up at home.     O:   T(C): 37.2 (11-30-22 @ 05:09), Max: 37.2 (11-30-22 @ 05:09)  HR: 72 (11-30-22 @ 04:03) (62 - 83)  BP: 130/62 (11-30-22 @ 04:03) (125/58 - 188/88)  RR: 18 (11-30-22 @ 04:03) (12 - 18)  SpO2: 92% (11-30-22 @ 04:03) (92% - 100%)  Wt(kg): --    GEN: patient appears well  LUNGS: no respiratory distress  ABD: obese, soft, mildly distended, nontender, no rebound or guarding, diminished BS, incisions c/d/i  EXT: b/l LE edema, +1, no calf tenderness, SCFs        11-29 @ 07:01  -  11-30 @ 06:41  --------------------------------------------------------  IN: 1500 mL / OUT: 700 mL / NET: 800 mL

## 2022-11-30 NOTE — CHART NOTE - NSCHARTNOTEFT_GEN_A_CORE
Evaluated patient at bedside after several severe range BPs. Patient had two severe range pressures and was given metoprolol 5 mg IVP, with /80 30 minutes after administration. Patient denies CP, palpitations, SOB. However, she does note a persistent HA in b/l temples, which has been on-and-off all day. Currently 6/10. Patient is due to ibuprofen, will give then reassess. Patient reports that she does feel more comfortable after moving from bed to chair. Additional metoprolol 5 mg IVP ordered for BPs that remain elevated. Evaluated patient at bedside after several severe range BPs. Patient had two severe range pressures and was given metoprolol 5 mg IVP, with BP of 176/80 30 minutes after administration. Patient denies CP, palpitations, SOB. However, she does note a persistent HA in b/l temples, which has been on-and-off all day. Currently 6/10, was 10/10 but received oxycodone earlier in the evening. Patient is due for ibuprofen, will give then reassess. Patient reports that she does feel more comfortable after moving from bed to chair. Additional metoprolol 5 mg IVP ordered for BPs that remain elevated. Evaluated patient at bedside after several severe range BPs. Patient had two severe range pressures and was given metoprolol 5 mg IVP, with BP of 176/80 30 minutes after administration. Patient denies CP, palpitations, SOB. However, she does note a persistent HA in b/l temples, which has been on-and-off all day. Currently 6/10, was 10/10 but received oxycodone earlier in the evening. Patient is due for ibuprofen, will give then reassess. Patient reports that she does feel more comfortable after moving from bed to chair. Abdomen soft, mildly tender, no rebound or guarding. Additional metoprolol 5 mg IVP ordered for BPs that remain elevated.

## 2022-11-30 NOTE — PROVIDER CONTACT NOTE (OTHER) - BACKGROUND
Pt GYN, s/p hysterectomy, oopherectomy
hx of stroke, DM, gallbladder problem, and HTN. Admitted for abdominal hysterectomy and b/l salpingooophorectomy.

## 2022-11-30 NOTE — PROVIDER CONTACT NOTE (OTHER) - ASSESSMENT
Pt alert and oriented x4, c/o nausea, denies any pain. HR 61 RR18 RR19 O2 Sat 98%
Pt /79 and headache still 6/10 despite treatment efforts.
Pt c/o 10/10 headache, BP in 190s after multiple checks

## 2022-11-30 NOTE — PROGRESS NOTE ADULT - ASSESSMENT
68yo postmenopausal P3 w/hx of stroke, prediabetes, morbid obesity, htn, hld and CAH now POD1 s/p TLH, BSO, cystoscopy.   - Admitedt to GYN for postoperative monitoring, Stable                                 1. Neuro/Pain:  Acetaminophen 1000mg IV q8 ATC, Toradol 30mg IV q8 ATC, Oxycodone 5mg or 10mg PRN  2  CV:   VS per routine  3. Pulm: Encourage ISS  4. GI: Low Residue Diet, Protonix 20mg daily, Simethicone PRN, heplock  5. :  s/p ashley, voiding  6. Heme: AM CBC/CMP  7. ID: --  8. DVT ppx: SCDs, Lovenox 40mg BID  9. Dispo: Likely POD#1

## 2022-11-30 NOTE — PROVIDER CONTACT NOTE (OTHER) - REASON
Pt /80, c/o 10/10 headache
bp high 188/88 , then 173/68
pt BP still in 170s after metoprolol, still c/o 6/10 headache after 10mg oxy

## 2022-11-30 NOTE — PROVIDER CONTACT NOTE (OTHER) - ACTION/TREATMENT ORDERED:
Yesenia reordered another dose of metoprolol IV and reactivated ibuprofen order for breakthrough pain
Gave Oxy 10, Yesenia told to recheck after 30 minutes - rechecked again after 30 minutes and still in 190s, Yesenia ordered metoprolol IV
Metoprolol 5mg IV push.

## 2022-12-01 ENCOUNTER — TRANSCRIPTION ENCOUNTER (OUTPATIENT)
Age: 67
End: 2022-12-01

## 2022-12-01 VITALS — TEMPERATURE: 99 F

## 2022-12-01 LAB
GLUCOSE BLDC GLUCOMTR-MCNC: 103 MG/DL — HIGH (ref 70–99)
GLUCOSE BLDC GLUCOMTR-MCNC: 107 MG/DL — HIGH (ref 70–99)

## 2022-12-01 PROCEDURE — 97161 PT EVAL LOW COMPLEX 20 MIN: CPT

## 2022-12-01 PROCEDURE — 85018 HEMOGLOBIN: CPT

## 2022-12-01 PROCEDURE — 84295 ASSAY OF SERUM SODIUM: CPT

## 2022-12-01 PROCEDURE — 36415 COLL VENOUS BLD VENIPUNCTURE: CPT

## 2022-12-01 PROCEDURE — 84132 ASSAY OF SERUM POTASSIUM: CPT

## 2022-12-01 PROCEDURE — 83735 ASSAY OF MAGNESIUM: CPT

## 2022-12-01 PROCEDURE — 86901 BLOOD TYPING SEROLOGIC RH(D): CPT

## 2022-12-01 PROCEDURE — 83036 HEMOGLOBIN GLYCOSYLATED A1C: CPT

## 2022-12-01 PROCEDURE — 80053 COMPREHEN METABOLIC PANEL: CPT

## 2022-12-01 PROCEDURE — 82330 ASSAY OF CALCIUM: CPT

## 2022-12-01 PROCEDURE — 82962 GLUCOSE BLOOD TEST: CPT

## 2022-12-01 PROCEDURE — 86850 RBC ANTIBODY SCREEN: CPT

## 2022-12-01 PROCEDURE — 88342 IMHCHEM/IMCYTCHM 1ST ANTB: CPT

## 2022-12-01 PROCEDURE — 86900 BLOOD TYPING SEROLOGIC ABO: CPT

## 2022-12-01 PROCEDURE — 84100 ASSAY OF PHOSPHORUS: CPT

## 2022-12-01 PROCEDURE — 85027 COMPLETE CBC AUTOMATED: CPT

## 2022-12-01 PROCEDURE — 82947 ASSAY GLUCOSE BLOOD QUANT: CPT

## 2022-12-01 PROCEDURE — 86803 HEPATITIS C AB TEST: CPT

## 2022-12-01 PROCEDURE — 88309 TISSUE EXAM BY PATHOLOGIST: CPT

## 2022-12-01 PROCEDURE — 88341 IMHCHEM/IMCYTCHM EA ADD ANTB: CPT

## 2022-12-01 RX ORDER — METOCLOPRAMIDE HCL 10 MG
10 TABLET ORAL ONCE
Refills: 0 | Status: COMPLETED | OUTPATIENT
Start: 2022-12-01 | End: 2022-12-01

## 2022-12-01 RX ADMIN — ENOXAPARIN SODIUM 40 MILLIGRAM(S): 100 INJECTION SUBCUTANEOUS at 06:49

## 2022-12-01 RX ADMIN — Medication 1000 MILLIGRAM(S): at 11:36

## 2022-12-01 RX ADMIN — Medication 1000 MILLIGRAM(S): at 06:46

## 2022-12-01 RX ADMIN — Medication 600 MILLIGRAM(S): at 09:46

## 2022-12-01 RX ADMIN — Medication 1000 MILLIGRAM(S): at 05:51

## 2022-12-01 RX ADMIN — Medication 10 MILLIGRAM(S): at 00:47

## 2022-12-01 RX ADMIN — Medication 1000 MILLIGRAM(S): at 00:27

## 2022-12-01 RX ADMIN — Medication 10 MILLIGRAM(S): at 05:51

## 2022-12-01 NOTE — PHYSICAL THERAPY INITIAL EVALUATION ADULT - PERTINENT HX OF CURRENT PROBLEM, REHAB EVAL
67yo postmenopausal woman w/CAH presents for scheduled TLH, BSO.     65 yo postmenopausal P3 who presents today with CAH results in EMB (4/12/22) for PMB.  The patient reports intermittent bleeding since before the onset of the COVID19 pandemic. The bleeding is sporadic and not every month. She occasionally notes clots and the bleeding ranges from bright red to dark brown. Denies any associated pain or blood in her bowel movements. She reports that she feels otherwise well.     Patient denies vaginal discomfort/itching, vaginal discharge, dysuria, changes to her bowel habits, incontinence or any other GYN symptoms.

## 2022-12-01 NOTE — PHYSICAL THERAPY INITIAL EVALUATION ADULT - ADDITIONAL COMMENTS
pt states he lives in 5th floor walk up. Pt uses a cane in home, rolling walker out of home, pt receives help from family members at baseline

## 2022-12-01 NOTE — PROGRESS NOTE ADULT - ASSESSMENT
66yo postmenopausal P3 w/hx of stroke, prediabetes, morbid obesity, htn, hld and CAH now POD2 s/p TLH, BSO, cystoscopy.   - Admitted to GYN for postoperative monitoring, Stable                                 1. Neuro/Pain:  Acetaminophen 1000mg IV q8 ATC, ibuprofen 600mg ATC, Oxycodone 5mg or 10mg PRN  2  CV:  HTN on enalapril 10mg qd, s/p 2 pushes of 5mg metoprolol o/n, now normotensive VS per routine  3. Pulm: Encourage ISS  4. GI: Low Residue Diet, Protonix 40mg daily, Simethicone PRN, heplock, +/-  5. :  s/p ashley, voiding  6. Heme: no further labs  7. ID: --  8. DVT ppx: SCDs, Lovenox 40mg BID  9. PT: PT consulted to assess mobility and safe discharge. f/u recommendations today  9. Dispo: Likely POD#2

## 2022-12-01 NOTE — DISCHARGE NOTE NURSING/CASE MANAGEMENT/SOCIAL WORK - PATIENT PORTAL LINK FT
You can access the FollowMyHealth Patient Portal offered by [facility] by registering at the following website: [email or web address]. By joining [facility]’s FollowMyHealth portal, you will also be able to view your health information using other applications (apps) compatible with our system.

## 2022-12-01 NOTE — DISCHARGE NOTE NURSING/CASE MANAGEMENT/SOCIAL WORK - NSDCPEFALRISK_GEN_ALL_CORE
For information on Fall & Injury Prevention, visit: [email or web address] OR  [email or web address] OR  https://www.cdc.gov/steadi/patient.html

## 2022-12-01 NOTE — PROGRESS NOTE ADULT - SUBJECTIVE AND OBJECTIVE BOX
GYN PROGRESS NOTE  o/n events: patient required two pushes of metoprolol 5mg IVP for elevated BP, now normotensive.     Patient evaluated at the bedside. Had a headache last night that resolved with tylenol. Reports the noise in the room is making it difficult for her to sleep and causing a headache.  One episode of nausea and emesis last night.  Since then denies n/v. Tolerating regular diet. Denies abdominal pain and is passing flatus.   Denies CP/SOB/dizziness/calf pain.  Ambulating with assistance. She is worried about her 5 flight walk up at home.     O:   T(C): 37 (12-01-22 @ 05:00), Max: 37.2 (11-30-22 @ 09:12)  HR: 78 (12-01-22 @ 04:00) (70 - 92)  BP: 131/55 (12-01-22 @ 04:00) (131/55 - 196/84)  RR: 18 (12-01-22 @ 04:00) (17 - 18)  SpO2: 95% (12-01-22 @ 04:00) (93% - 95%)  Wt(kg): --    GEN: patient appears well  LUNGS: no respiratory distress  ABD: obese, soft, nontender, nondistended, no rebound or guarding, +BS, incisions c/d/i  EXT: +1 edema b/l, no calf tenderness, SCDs        11-29 @ 07:01  -  11-30 @ 07:00  --------------------------------------------------------  IN: 1500 mL / OUT: 700 mL / NET: 800 mL    11-30 @ 07:01  -  12-01 @ 06:31  --------------------------------------------------------  IN: 1170 mL / OUT: 1150 mL / NET: 20 mL

## 2022-12-02 ENCOUNTER — NON-APPOINTMENT (OUTPATIENT)
Age: 67
End: 2022-12-02

## 2022-12-02 LAB — SURGICAL PATHOLOGY STUDY: SIGNIFICANT CHANGE UP

## 2022-12-09 ENCOUNTER — APPOINTMENT (OUTPATIENT)
Dept: GYNECOLOGIC ONCOLOGY | Facility: CLINIC | Age: 67
End: 2022-12-09

## 2022-12-09 DIAGNOSIS — K59.00 CONSTIPATION, UNSPECIFIED: ICD-10-CM

## 2022-12-09 PROCEDURE — 99024 POSTOP FOLLOW-UP VISIT: CPT

## 2022-12-09 RX ORDER — POLYETHYLENE GLYCOL 3350 17 G/17G
17 POWDER, FOR SOLUTION ORAL DAILY
Qty: 30 | Refills: 0 | Status: ACTIVE | COMMUNITY
Start: 2022-12-09 | End: 1900-01-01

## 2022-12-09 NOTE — HISTORY OF PRESENT ILLNESS
[FreeTextEntry1] : Problem List\par 1. Stage 1A G1 endometrial adenocarcinoma\par \par Previous Therapy\par 1. PAP 4/19/22- Neg \par 1. S/P EMB 4/25/22- CAH\par 2. S/P ECC 4/25/22- Endocervical mucosa with squamous metaplasia \par 3. CT A/P 11/26/22\par     a) Thickened endometrium consistent with reported complex atypical endometrial hyperplasia.\par     b) There is a 1.9 cm hypodense right midpole renal lesion, possible mildly complex proteinaceous/hemorrhagic cyst. Recommend follow-up renal mass protocol MRI for further characterization.\par     c) Geographic hepatic steatosis\par 4. S/P TLH, BSO, SLN mapping, cystoscopy 11/29/22\par     a) Pathology: \par - MMR intact\par - Endometrial adenocarcinoma, endometrioid type, FIGO grade I, arising in background of complex atypical hyperplasia and associated with no myometrial invasion\par - Leiomyomata, 2.9 cm and 0.4 cm in greatest dimension\par - Cervix with Nabothian cysts, negative for tumor\par - Bilateral fallopian tubes and ovaries negative for tumor\par \par

## 2022-12-09 NOTE — ASSESSMENT
[FreeTextEntry1] : S/P TLH, BSO, SLN Mapping, cystoscopy\par Doing well post-operatively\par \par Stage 1A G1 endometrial adenocarcinoma, no myometrial invasion\par \par \par [] post-op precautions given\par [] f/u with Dr. Peters in 3 weeks\par [] recommend Miralax qd-bid for constipation\par

## 2022-12-09 NOTE — REASON FOR VISIT
[Home] : at home, [unfilled] , at the time of the visit. [Medical Office: ([facility])___] : at the medical office located in  [Patient] : the patient [Self] : self [FreeTextEntry1] : 1 Week Postop\par \par 66yo S/P TLH, BSO, SLN mapping, cystoscopy here for 1 week post-op visit. Pt feeling well. minimal pain, not taking medications. +constipated, able to go a little. ambulating in the house b/c she has a 5 story walk up. Pathology discussed.\par \par ObHx:  x3\par GynHx: Denies abnormal pap smears, STIs, ovarian cysts, fibroids; not currently sexually active; premature menopause at the age ~37 (no work-up was done) post-menopausal, currently without symptoms. \par MedHx: Stroke in 2022 (right hand weakness), went to [facility] by then could not move her legs with nausea. CT head normal scan, takes ASA since then., hypertension, pre-diabetes, obesity, arthritis\par SurgHx: Right knee and ankle surgery, L/S cholecystectomy (a few years back)\par \par Social: Former smoker , No alcohol use, No illicit drug use\par NKDA ; allergy to adhesive \par Medications: metformin QD, enalapril QD, ASA 81mg\par Family history of liver cancer - Mother, and malignant neoplasm of kidney - brother.

## 2022-12-09 NOTE — PHYSICAL EXAM
[Normal] : No focal neurologic defects observed [Restricted in physically strenuous activity but ambulatory and able to carry out work of a light or sedentary nature] : Status 1- Restricted in physically strenuous activity but ambulatory and able to carry out work of a light or sedentary nature, e.g., light house work, office work [de-identified] : incisions c/d/i

## 2022-12-16 DIAGNOSIS — Z91.048 OTHER NONMEDICINAL SUBSTANCE ALLERGY STATUS: ICD-10-CM

## 2022-12-16 DIAGNOSIS — E78.5 HYPERLIPIDEMIA, UNSPECIFIED: ICD-10-CM

## 2022-12-16 DIAGNOSIS — Z28.21 IMMUNIZATION NOT CARRIED OUT BECAUSE OF PATIENT REFUSAL: ICD-10-CM

## 2022-12-16 DIAGNOSIS — Z79.84 LONG TERM (CURRENT) USE OF ORAL HYPOGLYCEMIC DRUGS: ICD-10-CM

## 2022-12-16 DIAGNOSIS — N85.02 ENDOMETRIAL INTRAEPITHELIAL NEOPLASIA [EIN]: ICD-10-CM

## 2022-12-16 DIAGNOSIS — I10 ESSENTIAL (PRIMARY) HYPERTENSION: ICD-10-CM

## 2022-12-16 DIAGNOSIS — C54.1 MALIGNANT NEOPLASM OF ENDOMETRIUM: ICD-10-CM

## 2022-12-16 DIAGNOSIS — E66.01 MORBID (SEVERE) OBESITY DUE TO EXCESS CALORIES: ICD-10-CM

## 2022-12-16 DIAGNOSIS — R73.03 PREDIABETES: ICD-10-CM

## 2022-12-16 DIAGNOSIS — Z86.73 PERSONAL HISTORY OF TRANSIENT ISCHEMIC ATTACK (TIA), AND CEREBRAL INFARCTION WITHOUT RESIDUAL DEFICITS: ICD-10-CM

## 2022-12-16 DIAGNOSIS — Z79.82 LONG TERM (CURRENT) USE OF ASPIRIN: ICD-10-CM

## 2022-12-16 DIAGNOSIS — G47.33 OBSTRUCTIVE SLEEP APNEA (ADULT) (PEDIATRIC): ICD-10-CM

## 2022-12-16 DIAGNOSIS — Z87.891 PERSONAL HISTORY OF NICOTINE DEPENDENCE: ICD-10-CM

## 2022-12-24 PROBLEM — K82.9 DISEASE OF GALLBLADDER, UNSPECIFIED: Chronic | Status: ACTIVE | Noted: 2022-11-28

## 2022-12-27 ENCOUNTER — APPOINTMENT (OUTPATIENT)
Dept: GYNECOLOGIC ONCOLOGY | Facility: CLINIC | Age: 67
End: 2022-12-27
Payer: MEDICARE

## 2023-01-06 ENCOUNTER — APPOINTMENT (OUTPATIENT)
Dept: GYNECOLOGIC ONCOLOGY | Facility: CLINIC | Age: 68
End: 2023-01-06
Payer: MEDICARE

## 2023-01-06 VITALS
SYSTOLIC BLOOD PRESSURE: 144 MMHG | HEART RATE: 72 BPM | WEIGHT: 293 LBS | DIASTOLIC BLOOD PRESSURE: 85 MMHG | TEMPERATURE: 97.3 F | BODY MASS INDEX: 50.02 KG/M2 | HEIGHT: 64 IN | OXYGEN SATURATION: 97 %

## 2023-01-06 PROCEDURE — 99214 OFFICE O/P EST MOD 30 MIN: CPT | Mod: 24

## 2023-01-06 NOTE — REASON FOR VISIT
[FreeTextEntry1] : 1 month Postop. Did not show up for appointment on \par \par 68yo S/P TLH, BSO, SLN mapping, cystoscopy here for 1 month post-op visit. Pathology consistent with  Stage 1A G1 endometrial adenocarcinoma. Pt feeling well. minimal pain. She shared with me that she was most nervous about post surgical pain leading up to surgery, but had NO pain after and is very grateful. \par \par ObHx:  x3\par GynHx: Denies abnormal pap smears, STIs, ovarian cysts, fibroids; not currently sexually active; premature menopause at the age ~37 (no work-up was done) post-menopausal, currently without symptoms. \par MedHx: Stroke in 2022 (right hand weakness), went to [facility] by then could not move her legs with nausea. CT head normal scan, takes ASA since then., hypertension, pre-diabetes, obesity, arthritis\par SurgHx: Right knee and ankle surgery, L/S cholecystectomy (a few years back)\par \par Social: Former smoker , No alcohol use, No illicit drug use\par NKDA ; allergy to adhesive \par Medications: metformin QD, enalapril QD, ASA 81mg\par Family history of liver cancer - Mother, and malignant neoplasm of kidney - brother.

## 2023-01-06 NOTE — ASSESSMENT
[FreeTextEntry1] : Stage 1A G1 endometrial adenocarcinoma, no myometrial invasion here for 1 month post op. Appropriate 1 month recovery.\par \par Indications for radiation discussed. Patient does not meet HIR criteria and therefore I recommend observation alone.\par \par She is traveling to [place] for a few months. Surveillance schedule and symptoms of recurrence disucssed.\par \par Follow up in 3 months\par \par \par \par

## 2023-01-06 NOTE — PHYSICAL EXAM
[Absent] : Adnexa(ae): Absent [Normal] : Anus and perineum: Normal sphincter tone, no masses, no prolapse. [de-identified] : incisions c/d/i [de-identified] : vagina well healed, stitches prsent

## 2023-01-07 PROBLEM — I63.9 CEREBRAL INFARCTION, UNSPECIFIED: Chronic | Status: ACTIVE | Noted: 2022-11-28

## 2023-01-07 PROBLEM — E11.9 TYPE 2 DIABETES MELLITUS WITHOUT COMPLICATIONS: Chronic | Status: ACTIVE | Noted: 2022-11-28

## 2023-01-13 ENCOUNTER — APPOINTMENT (OUTPATIENT)
Dept: NEUROLOGY | Facility: CLINIC | Age: 68
End: 2023-01-13

## 2023-04-07 ENCOUNTER — APPOINTMENT (OUTPATIENT)
Dept: GYNECOLOGIC ONCOLOGY | Facility: CLINIC | Age: 68
End: 2023-04-07
Payer: MEDICARE

## 2023-04-28 ENCOUNTER — APPOINTMENT (OUTPATIENT)
Dept: GYNECOLOGIC ONCOLOGY | Facility: CLINIC | Age: 68
End: 2023-04-28
Payer: MEDICARE

## 2023-04-28 VITALS
HEART RATE: 79 BPM | TEMPERATURE: 97.8 F | OXYGEN SATURATION: 97 % | SYSTOLIC BLOOD PRESSURE: 170 MMHG | HEIGHT: 64 IN | DIASTOLIC BLOOD PRESSURE: 92 MMHG | BODY MASS INDEX: 50.02 KG/M2 | WEIGHT: 293 LBS

## 2023-04-28 PROCEDURE — 99215 OFFICE O/P EST HI 40 MIN: CPT

## 2023-04-28 NOTE — HISTORY OF PRESENT ILLNESS
[FreeTextEntry1] : Problem List\par 1. Stage 1A G1 endometrial adenocarcinoma\par 2. MMR intact \par \par Previous Therapy\par 1. PAP 4/19/22- Neg \par 1. S/P EMB 4/25/22- CAH\par 2. S/P ECC 4/25/22- Endocervical mucosa with squamous metaplasia \par 3. CT A/P 11/26/22\par     a) Thickened endometrium consistent with reported complex atypical endometrial hyperplasia.\par     b) There is a 1.9 cm hypodense right midpole renal lesion, possible mildly complex proteinaceous/hemorrhagic cyst. Recommend follow-up renal mass protocol MRI for further characterization.\par     c) Geographic hepatic steatosis\par 4. S/P TLH, BSO, SLN mapping, cystoscopy 11/29/22\par     a) Pathology: \par - MMR intact\par - Endometrial adenocarcinoma, endometrioid type, FIGO grade I, arising in background of complex atypical hyperplasia and associated with no myometrial invasion\par - Leiomyomata, 2.9 cm and 0.4 cm in greatest dimension\par - Cervix with Nabothian cysts, negative for tumor\par - Bilateral fallopian tubes and ovaries negative for tumor\par \par

## 2023-04-28 NOTE — REASON FOR VISIT
[FreeTextEntry1] : 3 month follow-up\par \par 66yo w/ Stage 1A G1 endometrial adenocarcinoma here for 3 month surveillance. \par \par Patient with no complaints. Denies VB or pelvic pain.\par \par Shared with me that she met with her PCP and her recent weight gain was discussed in detail. She is planning to reach out to the recommended nutritionist. She declines bariatric referral.\par \par ObHx:  x3\par GynHx: Denies abnormal pap smears, STIs, ovarian cysts, fibroids; not currently sexually active; premature menopause at the age ~37 (no work-up was done) post-menopausal, currently without symptoms. \par MedHx: Stroke in 2022 (right hand weakness), went to [facility] by then could not move her legs with nausea. CT head normal scan, takes ASA since then., hypertension, pre-diabetes, obesity, arthritis\par SurgHx: Right knee and ankle surgery, L/S cholecystectomy (a few years back)\par \par Social: Former smoker , No alcohol use, No illicit drug use\par NKDA ; allergy to adhesive \par Medications: metformin QD, enalapril QD, ASA 81mg\par Family history of liver cancer - Mother, and malignant neoplasm of kidney - brother.

## 2023-04-28 NOTE — ASSESSMENT
[FreeTextEntry1] : RENU\par \par S/S of recurrence discussed.\par \par Patient informed that our office policy is to allow a 15 minute francia period. 30 minute lateness will not be allowed at future visits.\par \par Continued follow up and discussion regarding weight loss with her PCP was strongly encouraged.\par \par Follow up in 3 months

## 2023-04-28 NOTE — PHYSICAL EXAM
[Absent] : Adnexa(ae): Absent [Normal] : Anus and perineum: Normal sphincter tone, no masses, no prolapse. [de-identified] : incisions c/d/i [de-identified] : RENU [de-identified] : very difficult exam given patient's body habitus

## 2023-07-14 ENCOUNTER — NON-APPOINTMENT (OUTPATIENT)
Age: 68
End: 2023-07-14

## 2023-07-25 ENCOUNTER — APPOINTMENT (OUTPATIENT)
Dept: GYNECOLOGIC ONCOLOGY | Facility: CLINIC | Age: 68
End: 2023-07-25

## 2023-10-06 ENCOUNTER — APPOINTMENT (OUTPATIENT)
Dept: GYNECOLOGIC ONCOLOGY | Facility: CLINIC | Age: 68
End: 2023-10-06
Payer: MEDICARE

## 2023-10-06 VITALS
TEMPERATURE: 96.9 F | OXYGEN SATURATION: 98 % | WEIGHT: 293 LBS | SYSTOLIC BLOOD PRESSURE: 168 MMHG | DIASTOLIC BLOOD PRESSURE: 147 MMHG | HEART RATE: 77 BPM | HEIGHT: 64 IN | BODY MASS INDEX: 50.02 KG/M2

## 2023-10-06 DIAGNOSIS — C54.1 MALIGNANT NEOPLASM OF ENDOMETRIUM: ICD-10-CM

## 2023-10-06 PROCEDURE — 99215 OFFICE O/P EST HI 40 MIN: CPT

## (undated) DEVICE — SPONGE ENDO PEANUT 5MM

## (undated) DEVICE — TROCAR COVIDIEN VERSAONE FIXATION CANNULA 5MM

## (undated) DEVICE — POSITIONER PINK PAD PIGAZZI SYSTEM XL W ARM PROTECTOR

## (undated) DEVICE — HANDPIECE HARMONIC BLUE

## (undated) DEVICE — TROCAR COVIDIEN VERSAONE BLUNT TIP HASSAN 12MM

## (undated) DEVICE — SUT VICRYL 0 36" CT-1 UNDYED

## (undated) DEVICE — D HELP - CLEARVIEW CLEARIFY SYSTEM

## (undated) DEVICE — DRSG 2X2

## (undated) DEVICE — ENDOCATCH 10MM SPECIMEN POUCH

## (undated) DEVICE — SOL IRR BAG NS 0.9% 3000ML

## (undated) DEVICE — DRSG DERMABOND 0.7ML

## (undated) DEVICE — FOLEY TRAY 16FR 5CC LF UMETER CLOSED

## (undated) DEVICE — SUT VICRYL 0 27" UR-5

## (undated) DEVICE — POSITIONER FOAM EGG CRATE ULNAR 2PCS (PINK)

## (undated) DEVICE — ENDOCATCH II 15MM

## (undated) DEVICE — SYR LUER LOK 30CC

## (undated) DEVICE — SUT MONOCRYL 4-0 27" PS-2 UNDYED

## (undated) DEVICE — SUT VLOC 180 0 9" GS-21 GREEN

## (undated) DEVICE — Device

## (undated) DEVICE — LAP PAD 18 X 18"

## (undated) DEVICE — TROCAR COVIDIEN VERSAONE BLADELESS FIXATION 12MM STANDARD

## (undated) DEVICE — TUBING STRYKER PNEUMOCLEAR SMOKE EVACUATION HIGH FLOW

## (undated) DEVICE — WARMING BLANKET UPPER ADULT

## (undated) DEVICE — INSUFFLATION NDL ETHICON ENDOPATH PNEUMOPARITONEUM 120MM

## (undated) DEVICE — UTERINE MANIPULATOR CONMED VCARE MED 34MM

## (undated) DEVICE — TROCAR ETHICON ENDOPATH XCEL BLADELESS 5MM X 150MM STABILITY

## (undated) DEVICE — NDL HYPO SAFE 22G X 1.5" (BLACK)

## (undated) DEVICE — PACK PERI GYN

## (undated) DEVICE — SUT VLOC 180 0 12" GS-21 GREEN

## (undated) DEVICE — GLV 6.5 PROTEXIS (WHITE)

## (undated) DEVICE — PACK GENERAL LAPAROSCOPY

## (undated) DEVICE — VENODYNE/SCD SLEEVE CALF MEDIUM

## (undated) DEVICE — LIGASURE BLUNT TIP 37CM

## (undated) DEVICE — TROCAR COVIDIEN VERSAPORT BLADELESS OPTICAL 5MM STANDARD

## (undated) DEVICE — NDL HYPO SAFE 18G X 1.5" (PINK)